# Patient Record
Sex: FEMALE | Race: WHITE | NOT HISPANIC OR LATINO | Employment: UNEMPLOYED | URBAN - NONMETROPOLITAN AREA
[De-identification: names, ages, dates, MRNs, and addresses within clinical notes are randomized per-mention and may not be internally consistent; named-entity substitution may affect disease eponyms.]

---

## 2023-03-24 PROBLEM — R87.612 LGSIL ON PAP SMEAR OF CERVIX: Status: ACTIVE | Noted: 2023-03-24

## 2023-03-24 PROBLEM — R09.81 NASAL CONGESTION: Status: ACTIVE | Noted: 2023-03-24

## 2023-03-24 PROBLEM — J32.4 CHRONIC PANSINUSITIS: Status: ACTIVE | Noted: 2023-03-24

## 2023-03-24 PROBLEM — J34.89 NASAL CRUSTING: Status: ACTIVE | Noted: 2023-03-24

## 2023-03-24 PROBLEM — N39.0 UTI (URINARY TRACT INFECTION): Status: ACTIVE | Noted: 2023-03-24

## 2023-03-24 PROBLEM — R06.83 SNORING: Status: ACTIVE | Noted: 2023-03-24

## 2023-03-24 PROBLEM — R63.5 WEIGHT GAIN: Status: ACTIVE | Noted: 2023-03-24

## 2023-03-24 PROBLEM — R89.6 ABNORMAL CYTOLOGY: Status: ACTIVE | Noted: 2023-03-24

## 2023-03-24 RX ORDER — MULTIVITAMIN
TABLET ORAL
COMMUNITY
Start: 2019-01-08 | End: 2023-03-28 | Stop reason: ALTCHOICE

## 2023-03-24 RX ORDER — LEVONORGESTREL 52 MG/1
INTRAUTERINE DEVICE INTRAUTERINE
COMMUNITY
Start: 2019-05-29 | End: 2023-03-28 | Stop reason: ALTCHOICE

## 2023-03-28 ENCOUNTER — OFFICE VISIT (OUTPATIENT)
Dept: PRIMARY CARE | Facility: CLINIC | Age: 28
End: 2023-03-28
Payer: COMMERCIAL

## 2023-03-28 VITALS
SYSTOLIC BLOOD PRESSURE: 114 MMHG | BODY MASS INDEX: 39.18 KG/M2 | WEIGHT: 235.2 LBS | HEART RATE: 84 BPM | HEIGHT: 65 IN | DIASTOLIC BLOOD PRESSURE: 70 MMHG

## 2023-03-28 DIAGNOSIS — N91.2 AMENORRHEA: ICD-10-CM

## 2023-03-28 DIAGNOSIS — E66.9 OBESITY (BMI 35.0-39.9 WITHOUT COMORBIDITY): ICD-10-CM

## 2023-03-28 DIAGNOSIS — R91.1 LUNG NODULE SEEN ON IMAGING STUDY: ICD-10-CM

## 2023-03-28 DIAGNOSIS — R11.0 NAUSEA: Primary | ICD-10-CM

## 2023-03-28 PROCEDURE — 99204 OFFICE O/P NEW MOD 45 MIN: CPT | Performed by: NURSE PRACTITIONER

## 2023-03-28 ASSESSMENT — ENCOUNTER SYMPTOMS
BRUISES/BLEEDS EASILY: 0
RESPIRATORY NEGATIVE: 1
WHEEZING: 0
PALPITATIONS: 0
CONSTIPATION: 0
APPETITE CHANGE: 0
VOMITING: 0
DIARRHEA: 0
DIFFICULTY URINATING: 0
DYSURIA: 0
FEVER: 0
ACTIVITY CHANGE: 0
ABDOMINAL PAIN: 0
CHEST TIGHTNESS: 0
LIGHT-HEADEDNESS: 0
SHORTNESS OF BREATH: 0
COLOR CHANGE: 0
NAUSEA: 1
MYALGIAS: 0
BLOOD IN STOOL: 0
DIZZINESS: 0
HEADACHES: 0
ARTHRALGIAS: 0

## 2023-03-28 NOTE — PROGRESS NOTES
"Subjective   Patient ID: Mariano Arreguin is a 27 y.o. female who presents for ER Follow-up (NP - Follow-up from her ER visit on on 3/23).    Here to establish care after ER visit for abd cramping on 3/23/23, ER record reviewed.  CT A/P showed lung nodule 6 mm, benign, low suspicion  Lab testing including UA unremarkable.     [facility]: 02/20/2023/home pregnancy negative, ER pregnancy was negative, still has not had period  Mirana removed in 01/2022  Trying to conceive  Has appointment with GYN on Thursday  Denies abd pain/cramping  Has had episodes of nausea, no vomiting, no diarrhea  Appetite stable  H/O chronic allergies, on allergy injections Dr Ochao, 2 nasal surgeries  Tonsillectomy/adenoidectomy  Follows with GYN, H/O abnormal PAP           Review of Systems   Constitutional:  Negative for activity change, appetite change and fever.   HENT: Negative.     Respiratory: Negative.  Negative for chest tightness, shortness of breath and wheezing.    Cardiovascular:  Negative for chest pain, palpitations and leg swelling.   Gastrointestinal:  Positive for nausea. Negative for abdominal pain, blood in stool, constipation, diarrhea and vomiting.   Genitourinary:  Negative for difficulty urinating, dysuria, pelvic pain and vaginal bleeding.   Musculoskeletal:  Negative for arthralgias and myalgias.   Skin:  Negative for color change.   Neurological:  Negative for dizziness, light-headedness and headaches.   Hematological:  Does not bruise/bleed easily.       Objective   /70 (Patient Position: Sitting)   Pulse 84   Ht 1.651 m (5' 5\")   Wt 107 kg (235 lb 3.2 oz)   BMI 39.14 kg/m²     Physical Exam  Constitutional:       General: She is not in acute distress.     Appearance: Normal appearance. She is obese. She is not ill-appearing.   HENT:      Head: Normocephalic.   Eyes:      Conjunctiva/sclera: Conjunctivae normal.      Pupils: Pupils are equal, round, and reactive to light.   Cardiovascular:      Rate and Rhythm: " Normal rate and regular rhythm.      Pulses: Normal pulses.   Pulmonary:      Effort: Pulmonary effort is normal.      Breath sounds: Normal breath sounds.   Abdominal:      General: Bowel sounds are normal.      Palpations: Abdomen is soft. There is no mass.      Tenderness: There is no abdominal tenderness.   Skin:     General: Skin is warm and dry.      Coloration: Skin is not pale.   Neurological:      Mental Status: She is alert and oriented to person, place, and time.         Assessment/Plan   Diagnoses and all orders for this visit:  Nausea   -Currently asymptomatic  Amenorrhea   -Urine pregnancy negative   -Follow up with GYN, appointment scheduled   Obesity (BMI 35.0-39.9 without comorbidity)   -Recommend low calorie , healthy diet and daily exercise  Lung nodule seen on imaging study   -Will repeat CT chest in 1 year  Other orders  -     Follow Up In Primary Care; Future

## 2023-04-10 LAB
DEHYDROEPIANDROSTERONE SULFATE (DHEA-S) (UG/DL) IN SER/: 147 UG/DL (ref 65–395)
PROGESTERONE (NG/ML) IN SER/PLAS: 6.9 NG/ML
THYROTROPIN (MIU/L) IN SER/PLAS BY DETECTION LIMIT <= 0.05 MIU/L: 1.54 MIU/L (ref 0.44–3.98)

## 2023-04-18 LAB
CHORIOGONADOTROPIN (MIU/ML) IN SER/PLAS: <2 MIU/ML
ESTIMATED AVERAGE GLUCOSE FOR HBA1C: 100 MG/DL
HEMOGLOBIN A1C/HEMOGLOBIN TOTAL IN BLOOD: 5.1 %
PROLACTIN (UG/L) IN SER/PLAS: 7.2 UG/L (ref 3–20)

## 2023-04-22 LAB — 17-HYDROXYPROGESTERONE (REFLAB): 119.82 NG/DL

## 2023-04-24 LAB
TESTOSTERONE FREE (CHAN): 3.2 PG/ML (ref 0.1–6.4)
TESTOSTERONE,TOTAL,LC-MS/MS: 18 NG/DL (ref 2–45)

## 2023-05-02 LAB — 17-HYDROXYPROGESTERONE (REFLAB): NORMAL NG/DL

## 2023-06-24 ENCOUNTER — PATIENT MESSAGE (OUTPATIENT)
Dept: PRIMARY CARE | Facility: CLINIC | Age: 28
End: 2023-06-24

## 2023-06-24 DIAGNOSIS — Z91.030 HISTORY OF BEE STING ALLERGY: Primary | ICD-10-CM

## 2023-07-05 RX ORDER — EPINEPHRINE 0.3 MG/.3ML
1 INJECTION SUBCUTANEOUS AS NEEDED
Qty: 1 EACH | Refills: 1 | Status: SHIPPED | OUTPATIENT
Start: 2023-07-05 | End: 2024-07-04

## 2023-09-21 ENCOUNTER — TELEMEDICINE (OUTPATIENT)
Dept: PRIMARY CARE | Facility: CLINIC | Age: 28
End: 2023-09-21
Payer: COMMERCIAL

## 2023-09-21 DIAGNOSIS — L24.7 CONTACT DERMATITIS AND ECZEMA DUE TO PLANT: Primary | ICD-10-CM

## 2023-09-21 PROCEDURE — 99213 OFFICE O/P EST LOW 20 MIN: CPT | Performed by: NURSE PRACTITIONER

## 2023-09-21 RX ORDER — PREDNISONE 20 MG/1
TABLET ORAL
Qty: 12 TABLET | Refills: 0 | Status: SHIPPED | OUTPATIENT
Start: 2023-09-21 | End: 2023-09-26

## 2023-09-21 RX ORDER — MUPIROCIN 20 MG/G
OINTMENT TOPICAL 3 TIMES DAILY
Qty: 22 G | Refills: 0 | Status: SHIPPED | OUTPATIENT
Start: 2023-09-21 | End: 2023-10-05 | Stop reason: ALTCHOICE

## 2023-09-28 ASSESSMENT — ENCOUNTER SYMPTOMS
FEVER: 0
CHILLS: 0
ARTHRALGIAS: 0

## 2023-09-28 NOTE — ASSESSMENT & PLAN NOTE
Hx and limited exam are consistent with contact dermatitis from plant.   We talked about typical course ( which can be protracted), OTCs and symptoms to report that might indicate cellulitis.   We discussed use of OTCs.  Follow with PCP any questions or concerns

## 2023-09-28 NOTE — PROGRESS NOTES
Subjective   Patient ID: Mariano Arreguin is a 28 y.o. female who presents for Rash.  Several days of itchy raised vesicular rash right lateral ankle, now weeping some serous fluid, few similar spots left forearm.   Preceded by outdoor activities and yardwork, suspects that is source given timing.        Review of Systems   Constitutional:  Negative for chills and fever.   Musculoskeletal:  Negative for arthralgias.   Skin:  Positive for rash.       Objective   Physical Exam  Constitutional:       Appearance: Normal appearance.   Pulmonary:      Effort: Pulmonary effort is normal.   Skin:     Comments: Rash of raised vesicular patch, somewhat linear, on erythematous base as noted.  Some clear discharge.         Assessment/Plan

## 2023-10-05 ENCOUNTER — OFFICE VISIT (OUTPATIENT)
Dept: PRIMARY CARE | Facility: CLINIC | Age: 28
End: 2023-10-05
Payer: COMMERCIAL

## 2023-10-05 VITALS
HEIGHT: 64 IN | DIASTOLIC BLOOD PRESSURE: 70 MMHG | SYSTOLIC BLOOD PRESSURE: 124 MMHG | HEART RATE: 90 BPM | WEIGHT: 250.7 LBS | BODY MASS INDEX: 42.8 KG/M2

## 2023-10-05 DIAGNOSIS — T78.40XA ACUTE ALLERGIC REACTION, INITIAL ENCOUNTER: Primary | ICD-10-CM

## 2023-10-05 PROCEDURE — 99213 OFFICE O/P EST LOW 20 MIN: CPT | Performed by: NURSE PRACTITIONER

## 2023-10-05 RX ORDER — METHYLPREDNISOLONE 4 MG/1
TABLET ORAL
Qty: 21 TABLET | Refills: 0 | Status: SHIPPED | OUTPATIENT
Start: 2023-10-05 | End: 2023-10-12

## 2023-10-05 ASSESSMENT — ENCOUNTER SYMPTOMS
MYALGIAS: 0
WHEEZING: 0
SHORTNESS OF BREATH: 0
ARTHRALGIAS: 0
BRUISES/BLEEDS EASILY: 0
LIGHT-HEADEDNESS: 0
HEADACHES: 0
ACTIVITY CHANGE: 0
CHEST TIGHTNESS: 0
FATIGUE: 0
FEVER: 0
DIZZINESS: 0
PALPITATIONS: 0

## 2023-10-05 NOTE — PROGRESS NOTES
"Subjective   Patient ID: Mariano Arreguin is a 28 y.o. female who presents for Itching (Started when she was on keflex - has red spots on her body in different places) and swelling in fingers (Worse recently).    Went to ER due to rash to ankle, she was Started an antibiotic 09/24/2023 (keflex) for cellulitis  Completed and steroid for 6 days.  1 week ago rash started all over body, rash has spread over the last week while completed Keflex, last dose Keflex was 10/04/2023    Is on allergy injection, follows with Dr Ochoa  Is currently trying to conceive wants to avoid any medication that may be harmful in pregnancy              Review of Systems   Constitutional:  Negative for activity change, fatigue and fever.   Respiratory:  Negative for chest tightness, shortness of breath and wheezing.    Cardiovascular:  Negative for chest pain, palpitations and leg swelling.   Musculoskeletal:  Negative for arthralgias and myalgias.   Skin:  Positive for rash.   Neurological:  Negative for dizziness, light-headedness and headaches.   Hematological:  Does not bruise/bleed easily.       Objective   /70 (Patient Position: Sitting)   Pulse 90   Ht 1.626 m (5' 4\")   Wt 114 kg (250 lb 11.2 oz)   BMI 43.03 kg/m²     Physical Exam  Vitals reviewed.   Constitutional:       General: She is not in acute distress.     Appearance: Normal appearance. She is obese.   Cardiovascular:      Rate and Rhythm: Normal rate and regular rhythm.   Pulmonary:      Effort: Pulmonary effort is normal.      Breath sounds: Normal breath sounds.   Skin:     Findings: Rash present. Rash is papular and purpuric.   Neurological:      Mental Status: She is alert and oriented to person, place, and time.         Assessment/Plan   Diagnoses and all orders for this visit:  Acute allergic reaction, initial encounter  -     methylPREDNISolone (Medrol Dospak) 4 mg tablets; Take as directed on package.  Advised to take Allegra or Claritin daily ad she may use " benadryl OTC as needed for itching.   Keflex has been marked as drug allergy in record

## 2023-10-23 ENCOUNTER — CONSULT (OUTPATIENT)
Dept: ENDOCRINOLOGY | Facility: CLINIC | Age: 28
End: 2023-10-23

## 2023-10-23 VITALS
HEIGHT: 65 IN | BODY MASS INDEX: 42.15 KG/M2 | WEIGHT: 253 LBS | DIASTOLIC BLOOD PRESSURE: 83 MMHG | HEART RATE: 73 BPM | SYSTOLIC BLOOD PRESSURE: 125 MMHG

## 2023-10-23 DIAGNOSIS — Z11.59 ENCOUNTER FOR SCREENING FOR OTHER VIRAL DISEASES: ICD-10-CM

## 2023-10-23 DIAGNOSIS — E66.01 CLASS 3 SEVERE OBESITY WITHOUT SERIOUS COMORBIDITY WITH BODY MASS INDEX (BMI) OF 40.0 TO 44.9 IN ADULT, UNSPECIFIED OBESITY TYPE (MULTI): ICD-10-CM

## 2023-10-23 DIAGNOSIS — Z31.41 FERTILITY TESTING: ICD-10-CM

## 2023-10-23 DIAGNOSIS — Z13.1 SCREENING FOR DIABETES MELLITUS: ICD-10-CM

## 2023-10-23 DIAGNOSIS — Z86.59 HISTORY OF DEPRESSION: ICD-10-CM

## 2023-10-23 DIAGNOSIS — Z01.812 ENCOUNTER FOR PREPROCEDURAL LABORATORY EXAMINATION: ICD-10-CM

## 2023-10-23 DIAGNOSIS — Z13.29 SCREENING FOR THYROID DISORDER: Primary | ICD-10-CM

## 2023-10-23 DIAGNOSIS — Z11.3 SCREENING FOR STDS (SEXUALLY TRANSMITTED DISEASES): ICD-10-CM

## 2023-10-23 DIAGNOSIS — L94.0 MORPHEA SCLERODERMA: ICD-10-CM

## 2023-10-23 DIAGNOSIS — N97.9 FEMALE INFERTILITY: ICD-10-CM

## 2023-10-23 DIAGNOSIS — Z13.71 SCREENING FOR GENETIC DISEASE CARRIER STATUS: ICD-10-CM

## 2023-10-23 DIAGNOSIS — Z01.83 ENCOUNTER FOR RH BLOOD TYPING: ICD-10-CM

## 2023-10-23 PROCEDURE — 99204 OFFICE O/P NEW MOD 45 MIN: CPT | Performed by: NURSE PRACTITIONER

## 2023-10-23 PROCEDURE — 99214 OFFICE O/P EST MOD 30 MIN: CPT | Performed by: NURSE PRACTITIONER

## 2023-10-23 ASSESSMENT — PAIN SCALES - GENERAL: PAINLEVEL: 0-NO PAIN

## 2023-10-23 ASSESSMENT — LIFESTYLE VARIABLES
TOBACCO_USE: NO
HISTORY_ALCOHOL_USE: YES
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
## 2023-10-27 ENCOUNTER — TELEPHONE (OUTPATIENT)
Dept: ENDOCRINOLOGY | Facility: CLINIC | Age: 28
End: 2023-10-27

## 2023-10-27 NOTE — TELEPHONE ENCOUNTER
TC to pt to follow up on new pt appt. With Amaris - Pt lives in [place] and would like to try to do SA,HSG and pelvic all in one day if possible; advised pt that this is not always possible but she can request it.    Also reviewed Myriad kits can be taken to  lab where they can get all of their labs drawn.   Reminded her about genetics authorization form.   Pt is ovulating this week. Transferred to  to schedule FUV: in 4-6 weeks.     10/27/23 at 2:19 PM - Cherie Cabrera RN

## 2023-11-13 ENCOUNTER — ANCILLARY PROCEDURE (OUTPATIENT)
Dept: ENDOCRINOLOGY | Facility: CLINIC | Age: 28
End: 2023-11-13

## 2023-11-13 ENCOUNTER — HOSPITAL ENCOUNTER (OUTPATIENT)
Dept: RADIOLOGY | Facility: HOSPITAL | Age: 28
Discharge: HOME | End: 2023-11-13
Payer: COMMERCIAL

## 2023-11-13 DIAGNOSIS — Z31.41 FERTILITY TESTING: ICD-10-CM

## 2023-11-13 DIAGNOSIS — R93.89 ABNORMAL RADIOGRAPHIC EXAMINATION: ICD-10-CM

## 2023-11-13 DIAGNOSIS — Z01.812 ENCOUNTER FOR PREPROCEDURAL LABORATORY EXAMINATION: ICD-10-CM

## 2023-11-13 DIAGNOSIS — Z31.41 FERTILITY TESTING: Primary | ICD-10-CM

## 2023-11-13 LAB — PREGNANCY TEST URINE, POC: NEGATIVE

## 2023-11-13 PROCEDURE — 74740 X-RAY FEMALE GENITAL TRACT: CPT

## 2023-11-13 PROCEDURE — 58340 CATHETER FOR HYSTEROGRAPHY: CPT

## 2023-11-13 PROCEDURE — 76830 TRANSVAGINAL US NON-OB: CPT

## 2023-11-13 PROCEDURE — 76830 TRANSVAGINAL US NON-OB: CPT | Performed by: OBSTETRICS & GYNECOLOGY

## 2023-11-13 PROCEDURE — 81025 URINE PREGNANCY TEST: CPT

## 2023-11-13 PROCEDURE — 2550000001 HC RX 255 CONTRASTS

## 2023-11-13 PROCEDURE — 74740 X-RAY FEMALE GENITAL TRACT: CPT | Performed by: OBSTETRICS & GYNECOLOGY

## 2023-11-13 PROCEDURE — 58340 CATHETER FOR HYSTEROGRAPHY: CPT | Performed by: OBSTETRICS & GYNECOLOGY

## 2023-11-13 PROCEDURE — 76856 US EXAM PELVIC COMPLETE: CPT | Performed by: OBSTETRICS & GYNECOLOGY

## 2023-11-13 RX ADMIN — IOHEXOL 4800 MG: 240 INJECTION, SOLUTION INTRATHECAL; INTRAVASCULAR; INTRAVENOUS; ORAL at 14:01

## 2023-11-13 NOTE — PROGRESS NOTES
Patient ID: Mariano Arreguin is a 28 y.o. female.    HSG    Date/Time: 11/13/2023 11:55 AM    Performed by: CHRISTAL Lan  Authorized by: CHRISTAL Lan    Consent:     Consent obtained:  Verbal and written    Consent given by:  Patient    Risks, benefits, and alternatives were discussed: yes      Risks discussed:  Bleeding, infection and pain  Universal protocol:     Procedure explained and questions answered to patient or proxy's satisfaction: yes      Relevant documents present and verified: yes      Test results available: yes      Imaging studies available: yes      Required blood products, implants, devices, and special equipment available: yes      Immediately prior to procedure, a time out was called: yes      Patient identity confirmed:  Verbally with patient, arm band and hospital-assigned identification number  Indications:     Indications:  Fertility testing  Pre-procedure details:     Skin preparation:  Povidone-iodine  Sedation:     Sedation type:  None  Anesthesia:     Anesthesia method:  None  Procedure specific details:      Assistant: DONELL Ingram CNP      Hysterosalpingogram (HSG) risks, benefits, alternatives, and personnel discussed with patient who agreed to proceed.    Procedural time out        Done in room where procedure done: Yes        Done just before starting procedure: Yes                                                 All members of procedural team involved in time-out: Yes                  Active communication used: Yes                                                         All team members agreed on procedure: Yes                                        Patient correctly identified by two identifiers: Yes                                  Correct side and site identified: Yes                                                     All needed special equipment/instruments available: Yes               Prior to the start of the procedure a time out was taken and the following  were verified: The identity of the patient using two patient identifiers.   Urine pregnancy test was performed and was negative.   Risks, benefits, and alternatives of the procedure were explained to the patient.  Informed consent was obtained.     The patient was placed in the dorsal lithotomy position and a sterile speculum was placed in the vagina. The cervix was sterilized with Betadine x 3. The anterior lip of the cervix was grasped with a single-tooth tenaculum. The acorn cannula was then placed in the cervix. The patient was positioned and images were taken with fluoroscopy as dye was inserted through the cannula. All instruments were then removed. The patient tolerated the procedure well and was discharged home the same day without complications.    PRELIMINARY NURSE PRACTITIONER ASSESSMENT - FOR A FINAL REPORT, PLEASE REFER TO THE FINALIZED DOCUMENTATION IN THE IMAGING TAB     Uterus:  marked arcuate uterine contour without filling defects  Tubes:  bilateral patency with free spill of dye, normal tubal architecture noted, and no loculations present.  Based on these findings, my recommendation is: Follow up required, chart forwarded to primary MD. Dr. Salas reviewed the images and TVUS today sub optimal for correlation. SIS recommended- will try to schedule this menstrual cycle., if unable then will call CD 1 next menstrual cycle     The patient was counseled regarding the above preliminary findings and understands these will be reviewed by the reading physician.     Dr. Salas reviewed images and plan.       11/13/23 at 4:46 PM - YAMILE Lan-IMELDA        Post-procedure details:     Procedure completion:  Tolerated well, no immediate complications

## 2023-11-15 ENCOUNTER — ANCILLARY PROCEDURE (OUTPATIENT)
Dept: ENDOCRINOLOGY | Facility: CLINIC | Age: 28
End: 2023-11-15

## 2023-11-15 DIAGNOSIS — Z01.812 ENCOUNTER FOR PREPROCEDURAL LABORATORY EXAMINATION: ICD-10-CM

## 2023-11-15 DIAGNOSIS — Z01.812 ENCOUNTER FOR PREPROCEDURAL LABORATORY EXAMINATION: Primary | ICD-10-CM

## 2023-11-15 DIAGNOSIS — R93.89 ABNORMAL RADIOGRAPHIC EXAMINATION: ICD-10-CM

## 2023-11-15 LAB — PREGNANCY TEST URINE, POC: NEGATIVE

## 2023-11-15 PROCEDURE — 81025 URINE PREGNANCY TEST: CPT | Performed by: NURSE PRACTITIONER

## 2023-11-15 PROCEDURE — 76831 ECHO EXAM UTERUS: CPT | Performed by: NURSE PRACTITIONER

## 2023-11-15 PROCEDURE — 76831 ECHO EXAM UTERUS: CPT | Performed by: OBSTETRICS & GYNECOLOGY

## 2023-11-15 NOTE — PROGRESS NOTES
Patient ID: Mariano Arreguin is a 28 y.o. female.    Sonohysterogram    Date/Time: 11/15/2023 2:29 PM    Performed by: CHRISTAL Vargas  Authorized by: CHRISTAL Vargas    Consent:     Consent obtained:  Verbal and written    Consent given by:  Patient    Procedural risks discussed:  Bleeding and possible continued pain    Patient questions answered: yes      Patient agrees, verbalizes understanding, and wants to proceed: yes      Instructions and paperwork completed: yes    Pre-procedure:     Pre-procedure timeout performed: yes      Prepped with: Betadine    Procedure:     Cervix cleaned and prepped: yes      Cervix dilated: no      Tenaculum applied to cervix: no      Uterus sounded: no      Catheter inserted: yes      Uterine cavity distended with saline: yes    Post-procedure:     Patient observed: yes      No complications: no      Estimated blood loss (mL): minimal.    Post procedure instructions given to patient: yes      Patient tolerated procedure well with no complications: yes    Comments:      Prior to the procedure, the patient was counseled regarding risks, benefits, and alternatives.    Saline Ultrasound Findings:  Uterus:  normal contour without filling defects, arcuate  Bubble Test performed: No  Additional Findings:   Follow up:  No further follow up required.    Images reviewed by Dr. Salas. Agreed with reading: Final viewpoint read:  Anteverted uterus with an  endometrial lining that measures as below.  The ovaries are normal in appearance bilaterally.    Sonohysterogram reveals an arcuate appearing uterine cavity with no evidence of intracavitary lesion or defect.       MILTON GO on 11/15/23 at 2:30 PM.

## 2023-11-20 ENCOUNTER — TELEPHONE (OUTPATIENT)
Dept: ENDOCRINOLOGY | Facility: CLINIC | Age: 28
End: 2023-11-20
Payer: COMMERCIAL

## 2023-11-20 NOTE — TELEPHONE ENCOUNTER
Returned call to , left voicemail letting him know Mariano's last name was updated in the system, also fixed name on [employer] and voided and resent Genetic Testing Authorization. Instructed to call back if there is anything else they need at this time.    11/20/23 at 2:58 PM - Eugenia Starr RN

## 2023-11-20 NOTE — TELEPHONE ENCOUNTER
Pt  calling to request that we change Mariano Arreguin to Mariano Quintana.  Last name changed with insurance, per .  Needs some genetic testing paperwork updated.  Please call [phone] when completed

## 2023-11-21 ENCOUNTER — TELEPHONE (OUTPATIENT)
Dept: ENDOCRINOLOGY | Facility: CLINIC | Age: 28
End: 2023-11-21
Payer: COMMERCIAL

## 2023-11-21 NOTE — TELEPHONE ENCOUNTER
TC to pt to follow up on appt with Amaris from October - no labs drawn yet and has a FUV in Dec .Please call back.    11/21/23 at 4:16 PM - Cherie Cabrera RN

## 2023-11-21 NOTE — TELEPHONE ENCOUNTER
----- Message from Aster Norwood LPN sent at 10/23/2023  3:43 PM EDT -----  Patient was informed to watch for the [employer] email.     Thank you

## 2023-11-22 ENCOUNTER — TELEPHONE (OUTPATIENT)
Dept: MATERNAL FETAL MEDICINE | Facility: CLINIC | Age: 28
End: 2023-11-22

## 2023-11-22 ENCOUNTER — LAB (OUTPATIENT)
Dept: LAB | Facility: LAB | Age: 28
End: 2023-11-22
Payer: COMMERCIAL

## 2023-11-22 DIAGNOSIS — E66.01 CLASS 3 SEVERE OBESITY WITHOUT SERIOUS COMORBIDITY WITH BODY MASS INDEX (BMI) OF 40.0 TO 44.9 IN ADULT, UNSPECIFIED OBESITY TYPE (MULTI): ICD-10-CM

## 2023-11-22 DIAGNOSIS — Z11.3 SCREENING FOR STDS (SEXUALLY TRANSMITTED DISEASES): ICD-10-CM

## 2023-11-22 DIAGNOSIS — Z31.41 FERTILITY TESTING: ICD-10-CM

## 2023-11-22 DIAGNOSIS — Z13.29 SCREENING FOR THYROID DISORDER: ICD-10-CM

## 2023-11-22 DIAGNOSIS — Z01.83 ENCOUNTER FOR RH BLOOD TYPING: ICD-10-CM

## 2023-11-22 DIAGNOSIS — Z11.59 ENCOUNTER FOR SCREENING FOR OTHER VIRAL DISEASES: ICD-10-CM

## 2023-11-22 DIAGNOSIS — Z13.1 SCREENING FOR DIABETES MELLITUS: ICD-10-CM

## 2023-11-22 LAB
ABO GROUP (TYPE) IN BLOOD: NORMAL
ALBUMIN SERPL BCP-MCNC: 4.5 G/DL (ref 3.4–5)
ALP SERPL-CCNC: 59 U/L (ref 33–110)
ALT SERPL W P-5'-P-CCNC: 74 U/L (ref 7–45)
ANION GAP SERPL CALC-SCNC: 12 MMOL/L (ref 10–20)
ANTIBODY SCREEN: NORMAL
AST SERPL W P-5'-P-CCNC: 41 U/L (ref 9–39)
BILIRUB SERPL-MCNC: 0.4 MG/DL (ref 0–1.2)
BUN SERPL-MCNC: 11 MG/DL (ref 6–23)
CALCIUM SERPL-MCNC: 9.9 MG/DL (ref 8.6–10.3)
CHLORIDE SERPL-SCNC: 105 MMOL/L (ref 98–107)
CHOLEST SERPL-MCNC: 192 MG/DL (ref 0–199)
CHOLESTEROL/HDL RATIO: 5.5
CO2 SERPL-SCNC: 25 MMOL/L (ref 21–32)
CREAT SERPL-MCNC: 0.68 MG/DL (ref 0.5–1.05)
ERYTHROCYTE [DISTWIDTH] IN BLOOD BY AUTOMATED COUNT: 12.7 % (ref 11.5–14.5)
EST. AVERAGE GLUCOSE BLD GHB EST-MCNC: 97 MG/DL
GFR SERPL CREATININE-BSD FRML MDRD: >90 ML/MIN/1.73M*2
GLUCOSE SERPL-MCNC: 108 MG/DL (ref 74–99)
HBA1C MFR BLD: 5 %
HBV SURFACE AG SERPL QL IA: NONREACTIVE
HCT VFR BLD AUTO: 42.3 % (ref 36–46)
HCV AB SER QL: NONREACTIVE
HDLC SERPL-MCNC: 35 MG/DL
HGB BLD-MCNC: 14.5 G/DL (ref 12–16)
HIV 1+2 AB+HIV1 P24 AG SERPL QL IA: NONREACTIVE
LDLC SERPL CALC-MCNC: ABNORMAL MG/DL
MCH RBC QN AUTO: 31.5 PG (ref 26–34)
MCHC RBC AUTO-ENTMCNC: 34.3 G/DL (ref 32–36)
MCV RBC AUTO: 92 FL (ref 80–100)
NON HDL CHOLESTEROL: 157 MG/DL (ref 0–149)
NRBC BLD-RTO: 0 /100 WBCS (ref 0–0)
PLATELET # BLD AUTO: 295 X10*3/UL (ref 150–450)
POTASSIUM SERPL-SCNC: 4.1 MMOL/L (ref 3.5–5.3)
PROT SERPL-MCNC: 7.1 G/DL (ref 6.4–8.2)
RBC # BLD AUTO: 4.61 X10*6/UL (ref 4–5.2)
RH FACTOR (ANTIGEN D): NORMAL
RUBV IGG SERPL IA-ACNC: 1.1 IA
RUBV IGG SERPL QL IA: POSITIVE
SODIUM SERPL-SCNC: 138 MMOL/L (ref 136–145)
T PALLIDUM AB SER QL: NONREACTIVE
TRIGL SERPL-MCNC: 426 MG/DL (ref 0–149)
TSH SERPL-ACNC: 1.94 MIU/L (ref 0.44–3.98)
VARICELLA ZOSTER IGG INDEX: 0.8 IA
VLDL: ABNORMAL
VZV IGG SER QL IA: NEGATIVE
WBC # BLD AUTO: 6.9 X10*3/UL (ref 4.4–11.3)

## 2023-11-22 PROCEDURE — 80061 LIPID PANEL: CPT

## 2023-11-22 PROCEDURE — 87340 HEPATITIS B SURFACE AG IA: CPT

## 2023-11-22 PROCEDURE — 80053 COMPREHEN METABOLIC PANEL: CPT

## 2023-11-22 PROCEDURE — 87800 DETECT AGNT MULT DNA DIREC: CPT

## 2023-11-22 PROCEDURE — 86901 BLOOD TYPING SEROLOGIC RH(D): CPT

## 2023-11-22 PROCEDURE — 36415 COLL VENOUS BLD VENIPUNCTURE: CPT

## 2023-11-22 PROCEDURE — 86787 VARICELLA-ZOSTER ANTIBODY: CPT

## 2023-11-22 PROCEDURE — 83516 IMMUNOASSAY NONANTIBODY: CPT

## 2023-11-22 PROCEDURE — 86900 BLOOD TYPING SEROLOGIC ABO: CPT

## 2023-11-22 PROCEDURE — 84443 ASSAY THYROID STIM HORMONE: CPT

## 2023-11-22 PROCEDURE — 86780 TREPONEMA PALLIDUM: CPT

## 2023-11-22 PROCEDURE — 83036 HEMOGLOBIN GLYCOSYLATED A1C: CPT

## 2023-11-22 PROCEDURE — 86317 IMMUNOASSAY INFECTIOUS AGENT: CPT

## 2023-11-22 PROCEDURE — 86803 HEPATITIS C AB TEST: CPT

## 2023-11-22 PROCEDURE — 86850 RBC ANTIBODY SCREEN: CPT

## 2023-11-22 PROCEDURE — 87389 HIV-1 AG W/HIV-1&-2 AB AG IA: CPT

## 2023-11-22 PROCEDURE — 85027 COMPLETE CBC AUTOMATED: CPT

## 2023-11-22 NOTE — TELEPHONE ENCOUNTER
TC to pt  to follow up on new pt appt. 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  Given contact number for MFM consult.  Pt has FUV with Amaris.    11/22/23 at 2:47 PM - Cherie Cabrera RN

## 2023-11-23 LAB
C TRACH RRNA SPEC QL NAA+PROBE: NEGATIVE
N GONORRHOEA DNA SPEC QL PROBE+SIG AMP: NEGATIVE

## 2023-11-26 LAB — MIS SERPL-MCNC: 5.02 NG/ML (ref 0.4–16.02)

## 2023-12-08 ENCOUNTER — TELEMEDICINE (OUTPATIENT)
Dept: ENDOCRINOLOGY | Facility: CLINIC | Age: 28
End: 2023-12-08
Payer: COMMERCIAL

## 2023-12-08 VITALS — BODY MASS INDEX: 39.99 KG/M2 | HEIGHT: 65 IN | WEIGHT: 240 LBS

## 2023-12-08 DIAGNOSIS — N97.8 ENCOUNTER FOR MALE FACTOR INFERTILITY IN FEMALE PATIENT: ICD-10-CM

## 2023-12-08 DIAGNOSIS — Z31.81 ENCOUNTER FOR MALE FACTOR INFERTILITY IN FEMALE PATIENT: ICD-10-CM

## 2023-12-08 DIAGNOSIS — N97.9 PRIMARY FEMALE INFERTILITY: Primary | ICD-10-CM

## 2023-12-08 PROCEDURE — 99213 OFFICE O/P EST LOW 20 MIN: CPT | Performed by: NURSE PRACTITIONER

## 2023-12-08 ASSESSMENT — PAIN SCALES - GENERAL: PAINLEVEL: 2

## 2023-12-08 NOTE — PROGRESS NOTES
Virtual Visit    Follow Up Visit HPI    Patient is a 28 y.o.  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     Testing to date:   Result Date Done   Type and Screen: O 2023   Rh: POS 2023   Antibody: NEG (Ref range: )  No results found for requested labs within last 1825 days.   Rubella: Positive (Ref range: Negative) 2023   Varicella: Negative (Ref range: Negative) 2023   TSH: 1.94 (Ref range: 0.44 - 3.98 mIU/L) 2023   AMH: 5.016 (Ref range: 0.401 - 16.015 ng/mL) 2023   PRL: 7.2 (Ref range: 3.0 - 20.0 ug/L) 2023   Testosterone: No results found for requested labs within last 365 days. No results found for requested labs within last 365 days.   DHEAS: 147 (Ref range: 65 - 395 ug/dL) 4/10/2023   FSH: No results found for requested labs within last 365 days. No results found for requested labs within last 365 days.   17 OHP: No results found for requested labs within last 365 days. No results found for requested labs within last 365 days.   Hepatitis B surface antigen: Nonreactive (Ref range: Nonreactive) 2023   Hepatitis C antibody: Nonreactive (Ref range: Nonreactive) 2023   HIV ½ Antigen Antibody screen with reflex: Nonreactive (Ref range: Nonreactive) 2023   Syphilis screening with reflex: Nonreactive (Ref range: Nonreactive) 2023   Other:     Hysterosalpingogram: [place] HYSTEROSALPINGOGRAM (2023):    Arcuate versus septated uterine cavity. Bilateral fill and spill  of dye from the fallopian tubes.    Saline Infused Sonography: US SONOHYSTEROGRAM (11/15/2023):   Fertility Testing  Impression  Anteverted uterus with an endometrial lining that measures as below. The ovaries are normal in appearance bilaterally.  Sonohysterogram reveals an arcuate appearing uterine cavity with no evidence of intracavitary lesion or defect.  Uterus  Uterus:     Visualized  Uterus position:  anteverted  Description of  uterine malformations:     none  Myometrium: normal  Endometrium:      uniform echogenicity: isoechogenic  Cervix details:   normal  Endometrial thickness, total  5.0 mm  Fibroids:   No fibroids identified  Polyps:     No polyps identified  Right Ovary  Rt ovary:   Visualized  Rt ovary morphology:    normal  Left Ovary  Lt ovary:   Visualized  Lt ovary morphology:    normal  Cul de Sac    GYN Pelvic Ultrasound: US PELVIS TRANSVAGINAL (11/13/2023):   Fertility Testing  Impression  Anteverted uterus that measures as seen below with a thin endometrial lining. The ovaries are normal in appearance bilaterally.  Uterus  Uterus:     Visualized  Uterus position:  anteverted  Myometrium: normal  Endometrium:      uniform echogenicity: isoechogenic  Cervix details:   normal  Uterus length     48.3 mm  Uterus width      36.9 mm  Uterus height     26.6 mm  Endometrial thickness, total  4.7 mm  Right Ovary  Rt ovary:   Visualized  Rt ovary morphology:    normal  Rt ovary D1 32.8 mm  Rt ovary D2 27.3 mm  Rt ovary D3 24.7 mm  Rt ovary Vol      11.6 cmï¿½  Rt ovarian follicle(s): Follicles identified  Rt ovarian follicles other findings:      Antral Follicles 10+<10mm  Left Ovary  Lt ovary:   Visualized  Lt ovary morphology:    normal  Lt ovary D1 29.3 mm  Lt ovary D2 18.9 mm  Lt ovary D3 15.9 mm  Lt ovary Vol      4.6 cmï¿½  Lt ovarian follicle(s): Follicles identified  Lt ovarian follicles other findings:      Antral Follicles 7<10mm      Partner SA: Oligospermia  Volume (Semen)  >=1.5 mL 3.9   Concentration(Semen)  >=15.00 mill/mL 5.49 Abnormal    Total Motility (Semen)  >=40 % 3 Abnormal    Prog. Motility (Semen)  >=32 % 2 Abnormal    Non Prog. Motility (Semen)  % 1   Total No of Sperm (Semen)  >=39 mill 21.40 Abnormal    Total No of Motile (Semen)  mill 0.56   Total No of Rnd Cells (Semen)  <=5 mill 4.6   Leukocyte (Semen)  mill/mL <1.0 NEGATIVE   Comment: Negative <1.0 mill/mL  Positive >=1.0 mill/mL   Morphology SEE BELOW    % Normal (Semen)  >=4 % 1.3 Abnormal      Treatment to date: none    Past Medical History:   Diagnosis Date    Atypical squamous cells of undetermined significance on cytologic smear of cervix (ASC-US) 08/02/2016    Atypical squamous cells of undetermined significance (ASCUS) on Papanicolaou smear of cervix    Dysuria 09/06/2016    Dysuria    Encounter for gynecological examination (general) (routine) without abnormal findings 08/02/2016    Well female exam with routine gynecological exam    Encounter for screening for infections with a predominantly sexual mode of transmission 08/02/2016    Screen for STD (sexually transmitted disease)    Encounter for therapeutic drug level monitoring 02/23/2016    Encounter for monitoring of methotrexate therapy    Hemorrhage, not elsewhere classified 09/24/2015    Ecchymosis    Long term (current) use of antimetabolite agent 10/09/2014    Methotrexate, long term, current use    Long term (current) use of non-steroidal anti-inflammatories (nsaid) 10/09/2014    NSAID long-term use    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    Suppression of ovarian secretion    Pain in left hip 03/16/2016    Left hip pain    Pain in unspecified joint 09/24/2015    Arthralgia    Personal history of diseases of the skin and subcutaneous tissue     History of morphea    Personal history of diseases of the skin and subcutaneous tissue 07/24/2017    History of morphea    Personal history of other diseases of the musculoskeletal system and connective tissue     Personal history of scleroderma    Personal history of other specified conditions 07/26/2017    History of headache    Urinary tract infection, site not specified 09/06/2016    Acute UTI    Vitamin D deficiency, unspecified 07/24/2017    Vitamin D insufficiency     Past Surgical History:   Procedure Laterality Date    ADENOIDECTOMY  2021    NASAL RECONSTRUCTION      NASAL SEPTOPLASTY W/ TURBINOPLASTY  2021    OTHER SURGICAL HISTORY  03/18/2014  "   Superficial Cauterization Of Turbinate Mucosa    TONSILLECTOMY       Current Outpatient Medications on File Prior to Visit   Medication Sig Dispense Refill    EPINEPHrine (Epipen) 0.3 mg/0.3 mL injection syringe Inject 0.3 mL (0.3 mg) as directed if needed for anaphylaxis. Call 911 after use. 1 each 1     No current facility-administered medications on file prior to visit.       BMI:   BMI Readings from Last 1 Encounters:   23 39.94 kg/m²     VITALS:  Ht 1.651 m (5' 5\")   Wt 109 kg (240 lb)   LMP 2023 (Exact Date)   BMI 39.94 kg/m²   LMP: Patient's last menstrual period was 2023 (exact date).    ASSESSMENT   28 y.o.  female with primary infertility x 1 year, Migraines with Aura, AutoImmune: Morphea (form of scleroderma)and the following pertinent medical issues: Migraines with Aura, AutoImmune: Morphea (form of scleroderma)  .  Partner SA: Oligospermia   Total No of Motile (Semen)  mill 0.56     COUNSELING  We discussed using Ovulation Predictor Kits to time fertility treatments.   Patient aware we recommend Clear Blue Easy Digital OPK (not advanced) however there are several choices for OPK on the market and any one that she chooses is fine to use.     We discussed that she should begin testing on cycle day 10 (Day 1 is first day of full flow menses).   We discussed that she is to use the second urine of the morning and call the office with + OPK prior to 4 pm that day if planning IUI.  If not planning IUI, patient advised to have intercourse day of + OPK.  Discussed use of luteal phase progesterone. Micronized progesterone (Prometrium) is an oral pill used vaginally to increase absorption to the uterus and decrease systemic side effects. It should be started 3 days post IUI, 4 days post + OPK, or 5 days post HCG trigger. A urine pregnancy test should be taken approximately two weeks after ovulation, if + stay on the progesterone and call the office, if negative stop the " progesterone or it may delay the onset of menses.  We discussed that Letrozole is used for ovulation induction and that recent studies have found letrozole is superior to Clomid for ovulation induction in patients with PCOS. We discussed common side effects of Letrozole including headaches, vision changes, hot flashes, mood changes, and breast tenderness.  Letrozole is NOT FDA approved for the treatment of infertility and was initially associated with (in some studies) a higher risk of congenital anomalies, although this was not found in a more recent large study of patients taking Letrozole for unexplained infertility. Patients must take a pregnancy test prior to starting Letrozole each month. We discussed that there is an increased risk of multiple gestation with Letrozole approximately 10% for twins and less than 1% for triplets.  Counseled regarding option of selective reduction for higher order multiples.    Routine Testing  Fertility Center  STDs Within 1 year   Genetic carrier Waiver/Completed   T&S Within 1 year   AMH Within 1 year   TSH Within 1 year   Rubella/Varicella Within 5 years     BMI Testing  Fertility Center  CBC Within 1 year   CMP Within 1 year   HgbA1c Within 1 year   Mag, Phos, Vit D <18 Within 1 year   MFM > 40  REQ   Wt loss consult > 40 OPT     PLAN  No orders of the defined types were placed in this encounter.      FOLLOW UP   Consults: MFM consult: indication:Scleroderma  and partner to schedule consult with Dr. Finch for severe oligospermia   Engaged MD  Take prenatal vitamins, vitamin D 2000 IUs daily  Discussed that treatment cannot proceed until checklist items are complete.   Additional testing for BMI < 18 or > 40: Yes.  Patient to schedule IVF consult with Dr. Smith Advised patient to arrange this now with the .  Chart to primary nurse for care coordination and patient check list/education.  Pt to follow up with PCP for elevated liver enzymes. If cleared  by PCP consider metformin  Schedule IVF consult with Dr. Sarah Arreguin  12/08/2023  4:19 PM

## 2023-12-12 ENCOUNTER — LAB (OUTPATIENT)
Dept: LAB | Facility: LAB | Age: 28
End: 2023-12-12
Payer: COMMERCIAL

## 2023-12-12 ENCOUNTER — OFFICE VISIT (OUTPATIENT)
Dept: PRIMARY CARE | Facility: CLINIC | Age: 28
End: 2023-12-12
Payer: COMMERCIAL

## 2023-12-12 VITALS
HEART RATE: 69 BPM | DIASTOLIC BLOOD PRESSURE: 80 MMHG | BODY MASS INDEX: 41.3 KG/M2 | HEIGHT: 65 IN | WEIGHT: 247.9 LBS | SYSTOLIC BLOOD PRESSURE: 124 MMHG

## 2023-12-12 DIAGNOSIS — R74.8 ELEVATED LIVER ENZYMES: Primary | ICD-10-CM

## 2023-12-12 DIAGNOSIS — E78.1 HYPERTRIGLYCERIDEMIA: ICD-10-CM

## 2023-12-12 DIAGNOSIS — R74.8 ELEVATED LIVER ENZYMES: ICD-10-CM

## 2023-12-12 LAB
FERRITIN SERPL-MCNC: 169 NG/ML (ref 8–150)
HAV IGM SER QL: NONREACTIVE
HBV CORE IGM SER QL: NONREACTIVE
HBV SURFACE AG SERPL QL IA: NONREACTIVE
HCV AB SER QL: NONREACTIVE

## 2023-12-12 PROCEDURE — 1036F TOBACCO NON-USER: CPT | Performed by: NURSE PRACTITIONER

## 2023-12-12 PROCEDURE — 82728 ASSAY OF FERRITIN: CPT

## 2023-12-12 PROCEDURE — 80074 ACUTE HEPATITIS PANEL: CPT

## 2023-12-12 PROCEDURE — 3008F BODY MASS INDEX DOCD: CPT | Performed by: NURSE PRACTITIONER

## 2023-12-12 PROCEDURE — 99213 OFFICE O/P EST LOW 20 MIN: CPT | Performed by: NURSE PRACTITIONER

## 2023-12-12 PROCEDURE — 36415 COLL VENOUS BLD VENIPUNCTURE: CPT

## 2023-12-12 ASSESSMENT — ENCOUNTER SYMPTOMS
BRUISES/BLEEDS EASILY: 0
MYALGIAS: 0
VOMITING: 0
FEVER: 0
SHORTNESS OF BREATH: 0
HEADACHES: 0
NAUSEA: 0
LIGHT-HEADEDNESS: 0
ARTHRALGIAS: 0
ACTIVITY CHANGE: 0
PALPITATIONS: 0
CONSTIPATION: 0
DIZZINESS: 0
CHEST TIGHTNESS: 0
DIARRHEA: 0
ABDOMINAL PAIN: 0
FATIGUE: 0

## 2023-12-12 NOTE — PROGRESS NOTES
"Subjective   Patient ID: Mariano Quintana is a 28 y.o. female who presents for discuss labs.    Is seeing reproductive health team, labs drawn showed elevated liver enzymes and elevated triglycerides.  Needs follow up lab and US             Review of Systems   Constitutional:  Negative for activity change, fatigue and fever.   Respiratory:  Negative for chest tightness and shortness of breath.    Cardiovascular:  Negative for chest pain, palpitations and leg swelling.   Gastrointestinal:  Negative for abdominal pain, constipation, diarrhea, nausea and vomiting.   Musculoskeletal:  Negative for arthralgias and myalgias.   Neurological:  Negative for dizziness, light-headedness and headaches.   Hematological:  Does not bruise/bleed easily.       Objective   /80 (Patient Position: Sitting)   Pulse 69   Ht 1.651 m (5' 5\")   Wt 112 kg (247 lb 14.4 oz)   LMP 12/07/2023 (Exact Date)   BMI 41.25 kg/m²     Physical Exam  Vitals reviewed.   Constitutional:       General: She is not in acute distress.     Appearance: Normal appearance. She is obese.   Cardiovascular:      Rate and Rhythm: Normal rate and regular rhythm.   Pulmonary:      Effort: Pulmonary effort is normal.   Abdominal:      General: Bowel sounds are normal.      Palpations: Abdomen is soft. There is no hepatomegaly or splenomegaly.   Neurological:      Mental Status: She is alert and oriented to person, place, and time.         Assessment/Plan   Diagnoses and all orders for this visit:  Elevated liver enzymes  -     US abdomen limited liver; Future  -     Ferritin; Future  -     Hepatitis panel, acute; Future  Hypertriglyceridemia   Advised to follow heart healthy diet, will hold off on medication at this time, recommend OTC omega -3 or Co Q-10 supplements if cleared by reproductive health team      "

## 2023-12-13 ENCOUNTER — HOSPITAL ENCOUNTER (OUTPATIENT)
Dept: RADIOLOGY | Facility: HOSPITAL | Age: 28
Discharge: HOME | End: 2023-12-13
Payer: COMMERCIAL

## 2023-12-13 DIAGNOSIS — R74.8 ELEVATED LIVER ENZYMES: ICD-10-CM

## 2023-12-13 PROCEDURE — 76705 ECHO EXAM OF ABDOMEN: CPT | Performed by: RADIOLOGY

## 2023-12-13 PROCEDURE — 76705 ECHO EXAM OF ABDOMEN: CPT

## 2023-12-14 ENCOUNTER — TELEPHONE (OUTPATIENT)
Dept: PRIMARY CARE | Facility: CLINIC | Age: 28
End: 2023-12-14
Payer: COMMERCIAL

## 2023-12-14 DIAGNOSIS — R79.89 ELEVATED FERRITIN LEVEL: Primary | ICD-10-CM

## 2023-12-14 NOTE — TELEPHONE ENCOUNTER
"Result Communication  Reviewed labs and recent Liver UA with patient, US did confirm \"fatty liver\" or steatosis, ferritin was elevated will check iron levels.  Patient may proceed with PCOS treatment including metformin.       Resulted Orders   Ferritin   Result Value Ref Range    Ferritin 169 (H) 8 - 150 ng/mL   Hepatitis panel, acute   Result Value Ref Range    Hepatitis B Surface AG Nonreactive Nonreactive      Comment:      Biotin interference may cause falsely decreased results. Patients taking a Biotin dose of up to 5 mg/day should refrain from taking Biotin for 24 hours before sample collection. Providers may contact their local laboratory for  further information.    Hepatitis A  AB- IgM Nonreactive Nonreactive      Comment:      Biotin interference may cause falsely decreased results. Patients taking a Biotin dose of up to 5 mg/day should refrain from taking Biotin for 24 hours before sample collection. Providers may contact their local laboratory  for further information.        Hepatitis B Core AB; IgM Nonreactive Nonreactive      Comment:      Results from patients taking biotin supplements or receiving high-dose biotin therapy should be interpreted with caution due to possible interference with this test. Providers may contact their local laboratory for further information.        Hepatitis C AB Nonreactive Nonreactive      Comment:      Results from patients taking biotin supplements or receiving high-dose biotin therapy should be interpreted with caution due to possible interference with this test. Providers may contact their local laboratory for further information.       1:27 PM      Results were successfully communicated with the patient and they acknowledged their understanding.    "

## 2023-12-18 ENCOUNTER — LAB (OUTPATIENT)
Dept: LAB | Facility: LAB | Age: 28
End: 2023-12-18
Payer: COMMERCIAL

## 2023-12-18 DIAGNOSIS — R79.89 ELEVATED FERRITIN LEVEL: ICD-10-CM

## 2023-12-18 LAB
IRON SATN MFR SERPL: 32 % (ref 25–45)
IRON SERPL-MCNC: 92 UG/DL (ref 35–150)
TIBC SERPL-MCNC: 286 UG/DL (ref 240–445)
UIBC SERPL-MCNC: 194 UG/DL (ref 110–370)

## 2023-12-18 PROCEDURE — 36415 COLL VENOUS BLD VENIPUNCTURE: CPT

## 2023-12-18 PROCEDURE — 83550 IRON BINDING TEST: CPT

## 2023-12-18 PROCEDURE — 83540 ASSAY OF IRON: CPT

## 2023-12-20 ENCOUNTER — TELEPHONE (OUTPATIENT)
Dept: MATERNAL FETAL MEDICINE | Facility: CLINIC | Age: 28
End: 2023-12-20
Payer: COMMERCIAL

## 2023-12-20 NOTE — TELEPHONE ENCOUNTER
Returned pt's call. LM to call the office back.     12/20/23 at 11:47 AM - Cherie Cabrera RN     Pt called back; states she was cleared to take Metform per her PCP, IMELDA Calles; note is in the chart on 12/14. RN sent note to Amaris to let her know; reviewed step up dosing and side effects of Metform.  Reviewed Myraid results - no mutual mutations; pt is planning on getting varicella vaccine and will let us know when; aware to wait to TTC for 30 days; Partner has appt. With Dr. RODRIGEZ in January; will await outcome for plan IUI vs IVF.    12/20/23 at 12:14 PM - Cherie Cabrera RN

## 2023-12-20 NOTE — TELEPHONE ENCOUNTER
MARGARET DENISE     PT is calling in regards to starting her metformin. She has been cleared by her PCP.

## 2023-12-27 ENCOUNTER — INITIAL PRENATAL (OUTPATIENT)
Dept: MATERNAL FETAL MEDICINE | Facility: CLINIC | Age: 28
End: 2023-12-27
Payer: COMMERCIAL

## 2023-12-27 ENCOUNTER — TELEPHONE (OUTPATIENT)
Dept: MATERNAL FETAL MEDICINE | Facility: CLINIC | Age: 28
End: 2023-12-27

## 2023-12-27 VITALS
WEIGHT: 249 LBS | SYSTOLIC BLOOD PRESSURE: 140 MMHG | BODY MASS INDEX: 41.48 KG/M2 | DIASTOLIC BLOOD PRESSURE: 92 MMHG | HEIGHT: 65 IN | HEART RATE: 94 BPM

## 2023-12-27 DIAGNOSIS — E28.2 PCOS (POLYCYSTIC OVARIAN SYNDROME): Primary | ICD-10-CM

## 2023-12-27 DIAGNOSIS — M34.9: ICD-10-CM

## 2023-12-27 PROCEDURE — 99215 OFFICE O/P EST HI 40 MIN: CPT | Performed by: STUDENT IN AN ORGANIZED HEALTH CARE EDUCATION/TRAINING PROGRAM

## 2023-12-27 RX ORDER — METFORMIN HYDROCHLORIDE 500 MG/1
TABLET, EXTENDED RELEASE ORAL
Qty: 90 TABLET | Refills: 2 | Status: SHIPPED | OUTPATIENT
Start: 2023-12-27

## 2023-12-27 NOTE — PROGRESS NOTES
"Mariano Quintana is a 29 yo G0 who presents for a preconception consultation for a history of scleroderma. Mariano reports she was diagnosed at age 15 with skin findings. She took Methotrexate \"on and off for about 6 years.\" She denies other systemic disease involvement. Mariano states she stopped seeing Rheumatology in 2016. Today she is doing ok but does reports occasional joint pain in her hands. Of note, she recently had a CT abdomen and pelvis that showed hepatic steatosis and multiple pulmonary nodules measuring up to 6 mm in size. Per the report, the nodules are nonspecific and of \"doubtful clinical significance given patient's stated age.\"     PMHX: Migraines  PSHX: septoplasty, turbinoplasty, tonsillectomy, adenoidectomy, graft for nose  OBHX: G0  GYN: denies STIs  MED: PNV, vitamin D, omega-3  ALL: Keflex, Compazine  SOCIAL: denies tobacco/alcohol/illicit drug use  FAMILY: father - DMII, HTN    O: BP (!) 140/92 (BP Location: Right arm, Patient Position: Sitting)   Pulse 94   Ht 1.651 m (5' 5\")   Wt 113 kg (249 lb)   LMP 12/07/2023 (Exact Date)   BMI 41.44 kg/m²   Gen: NAD  Resp: nonlabored breathing  Cardiac: good peripheral perfusion  Psych: appropriate mood and affect    A/P: Mariano Quintana is a 29 yo G0 who presents for a preconception consultation for a history of scleroderma    *Scleroderma (also known as Systemic Sclerosis)  Systemic Sclerosis is a disease that can affect a variety of organ systems and lead to both vascular dysfunction and fibrosis of the skin. It is diagnosed based on clinical findings but can also have abnormal serologic testing (I.e. abnormal LAMONTE, anti-Scl-70, ANDRÉS, anti-RNA polymerase III antibody). There are multiple subsets including limited cutaneous systemic sclerosis, diffuse cutaneous systemic sclerosis, systemic sclerosis without skin involvement and systemic sclerosis with overlap of other autoimmune diseases. Mariano's symptoms appear to be most consistent with limited cutaneous " systemic sclerosis.    Women with systemic sclerosis can have successful pregnancies, however, there is some data that suggests an increased risk of miscarriage,  delivery and fetal growth restriction.     We discussed that pregnant patients with systemic sclerosis should be monitored closely for disease activity and sequelae. It is essential they follow with their Rheumatologist. They should have a recent echocardiogram to screen for pulmonary hypertension. I have taken the liberty of ordering this today. Baseline pre-eclampsia labs are recommended due to the increased risk of PreE, but also due to the risk of systemic sclerosis renal crisis. A low dose aspirin daily starting at 12 weeks is recommended. Given the possibility of multi-organ involvement, a third trimester anesthesia consultation is recommended.     Systemic sclerosis may overlap with lupus, as such I recommend SSA/SSB antibody testing. As systemic sclerosis is a vascular disease, close monitoring of fetal growth is recommended, with serial growth ultrasounds starting at 28 weeks gestation. Antepartum testing with weekly nonstress tests is recommended starting at 32 weeks. Delivery at [id number]-59h2h is recommended. If there is perineal skin fibrosis, vaginal delivery may be difficult to achieve, but the risks and benefits of  should be discussed.    Thank you for the consultation. I have recommended that Mariano establish care with a Rheumatologist. At this time there is no contraindication to pregnancy. I Would be happy to see Mariano back for a consultation once she becomes pregnant.     Min Garcia MD  Maternal-Fetal Medicine

## 2023-12-27 NOTE — PROGRESS NOTES
Pt. Given referral req for rheumatology and tte along with number to schedule. Pt verbalizes understanding and agrees to plan.  Pt. Has no further questions or needs at this time.  Pt. Has Dannielle number to call if further questions arise.

## 2024-01-02 ENCOUNTER — OFFICE VISIT (OUTPATIENT)
Dept: RHEUMATOLOGY | Facility: CLINIC | Age: 29
End: 2024-01-02
Payer: COMMERCIAL

## 2024-01-02 VITALS
BODY MASS INDEX: 41.83 KG/M2 | HEART RATE: 77 BPM | SYSTOLIC BLOOD PRESSURE: 119 MMHG | HEIGHT: 64 IN | WEIGHT: 245 LBS | TEMPERATURE: 96.8 F | DIASTOLIC BLOOD PRESSURE: 77 MMHG

## 2024-01-02 DIAGNOSIS — M34.9: ICD-10-CM

## 2024-01-02 DIAGNOSIS — L94.0 MORPHEA: Primary | ICD-10-CM

## 2024-01-02 DIAGNOSIS — M25.50 ARTHRALGIA, UNSPECIFIED JOINT: ICD-10-CM

## 2024-01-02 PROCEDURE — 3008F BODY MASS INDEX DOCD: CPT | Performed by: INTERNAL MEDICINE

## 2024-01-02 PROCEDURE — 99204 OFFICE O/P NEW MOD 45 MIN: CPT | Performed by: INTERNAL MEDICINE

## 2024-01-02 PROCEDURE — 1036F TOBACCO NON-USER: CPT | Performed by: INTERNAL MEDICINE

## 2024-01-02 ASSESSMENT — PAIN SCALES - GENERAL: PAINLEVEL: 0-NO PAIN

## 2024-01-02 NOTE — PROGRESS NOTES
Subjective   Patient ID: Mariano Quintana is a 28 y.o. female who presents for New Patient Visit (New patient visit).    HPI  27 yo female with morphea  The patient used MTX for morphea for 6 years, then she stopped it in 2016.  She is trying to get pregnant now and GYN referred her to us.  Her morphea, was diagnosed at age 15 and localized in her left thigh and left site of abdomen.   MTX helped in her morphea lesions, but after she stopped it, her lesions remained stable.  She reports swollen fingers and Raynaud like symptoms.   She has GERD as well.   She also reports dizziness and palpitation episodes.  Her AM stiffness lasts 30 min  No h/o miscarriage or DVT  She denies dry eyes, dry mouth  Family h/o did not reveal any inflammatory arthritis.    ROS  Joint pain in hands: positive  Joint swelling: negative  Morning stiffness and duration: positive 10 min   strength: normal  Oral ulcer: negative  Genital ulcer: negative  Raynaud phenomenon: negative  Chest pain/dyspnea: negative  Low back pain: negative  Visual problem: negative  Dry eyes/dry mouth: negative  Skin rash/scaling/psoriasis: negative       Objective     PEXAM  VS reviewed, WNL  General: Alert, no distress   HEENT: Normocephalic/atraumatic, No alopecia. PERRLA. Sclera white, conjunctiva pink, no malar rash. no oral or nasal ulcer. Oral cavity pink and moist, no erythema or exudate, dentition good.   Neck: supple  Respiratory: CTA B, no adventitious breath sounds  Cardiac: RRR, no murmurs, carotid, or bruits  Abdominal: symmetrical, soft, non-tender, non-distended, normoactive BSx4 quadrants, no CVA tenderness or suprapubic tenderness  MSK: Joints of upper and lower extremities were assessed for synovitis and ROM.    Today she has no evidence of synovitis in the joints of her hands or wrists, tender joint count 0, swollen joint count 0   Extremities: no clubbing, no cyanosis, no edema  Skin: Skin warm and moist.   +morphea in her left abdomen and  thigh  Neuro: non-focal, Strength 5/5 throughout. Normal gait. No cerebellar pathologic exam     Assessment/Plan   27 yo female with morphea  It was diagnosed at age 15 and used MTX for 6 years, stopped it in 2016.  MTX helped moderately.  She is trying to get pregnant now.  Also, she has noted swelling in her fingers and Raynaud like mild symptoms.  PExam did not reveal any arthritis, sclerodactyli, telangiectasis, it showed only morphea lesions in her left abdomen and thigh.  I think she has only localized scleroderma, morhea.    -will see her ESR, CRP, LAMONTE, RF  -will see her once a year

## 2024-01-03 ENCOUNTER — LAB (OUTPATIENT)
Dept: LAB | Facility: LAB | Age: 29
End: 2024-01-03
Payer: COMMERCIAL

## 2024-01-03 DIAGNOSIS — L94.0 MORPHEA: ICD-10-CM

## 2024-01-03 DIAGNOSIS — M25.50 ARTHRALGIA, UNSPECIFIED JOINT: ICD-10-CM

## 2024-01-03 LAB
ALBUMIN SERPL BCP-MCNC: 4.5 G/DL (ref 3.4–5)
ALP SERPL-CCNC: 57 U/L (ref 33–110)
ALT SERPL W P-5'-P-CCNC: 48 U/L (ref 7–45)
ANION GAP SERPL CALC-SCNC: 12 MMOL/L (ref 10–20)
AST SERPL W P-5'-P-CCNC: 26 U/L (ref 9–39)
BASOPHILS # BLD AUTO: 0.03 X10*3/UL (ref 0–0.1)
BASOPHILS NFR BLD AUTO: 0.4 %
BILIRUB SERPL-MCNC: 0.3 MG/DL (ref 0–1.2)
BUN SERPL-MCNC: 11 MG/DL (ref 6–23)
CALCIUM SERPL-MCNC: 9.7 MG/DL (ref 8.6–10.3)
CCP IGG SERPL-ACNC: <1 U/ML
CHLORIDE SERPL-SCNC: 105 MMOL/L (ref 98–107)
CO2 SERPL-SCNC: 25 MMOL/L (ref 21–32)
CREAT SERPL-MCNC: 0.68 MG/DL (ref 0.5–1.05)
CRP SERPL-MCNC: 0.75 MG/DL
EOSINOPHIL # BLD AUTO: 0.13 X10*3/UL (ref 0–0.7)
EOSINOPHIL NFR BLD AUTO: 1.8 %
ERYTHROCYTE [DISTWIDTH] IN BLOOD BY AUTOMATED COUNT: 12.2 % (ref 11.5–14.5)
ERYTHROCYTE [SEDIMENTATION RATE] IN BLOOD BY WESTERGREN METHOD: 11 MM/H (ref 0–20)
GFR SERPL CREATININE-BSD FRML MDRD: >90 ML/MIN/1.73M*2
GLUCOSE SERPL-MCNC: 109 MG/DL (ref 74–99)
HCT VFR BLD AUTO: 44 % (ref 36–46)
HGB BLD-MCNC: 15.2 G/DL (ref 12–16)
IMM GRANULOCYTES # BLD AUTO: 0.03 X10*3/UL (ref 0–0.7)
IMM GRANULOCYTES NFR BLD AUTO: 0.4 % (ref 0–0.9)
LYMPHOCYTES # BLD AUTO: 2.61 X10*3/UL (ref 1.2–4.8)
LYMPHOCYTES NFR BLD AUTO: 36.4 %
MCH RBC QN AUTO: 31.7 PG (ref 26–34)
MCHC RBC AUTO-ENTMCNC: 34.5 G/DL (ref 32–36)
MCV RBC AUTO: 92 FL (ref 80–100)
MONOCYTES # BLD AUTO: 0.45 X10*3/UL (ref 0.1–1)
MONOCYTES NFR BLD AUTO: 6.3 %
NEUTROPHILS # BLD AUTO: 3.93 X10*3/UL (ref 1.2–7.7)
NEUTROPHILS NFR BLD AUTO: 54.7 %
NRBC BLD-RTO: 0 /100 WBCS (ref 0–0)
PLATELET # BLD AUTO: 296 X10*3/UL (ref 150–450)
POTASSIUM SERPL-SCNC: 4 MMOL/L (ref 3.5–5.3)
PROT SERPL-MCNC: 7.1 G/DL (ref 6.4–8.2)
RBC # BLD AUTO: 4.79 X10*6/UL (ref 4–5.2)
RHEUMATOID FACT SER NEPH-ACNC: <10 IU/ML (ref 0–15)
SODIUM SERPL-SCNC: 138 MMOL/L (ref 136–145)
WBC # BLD AUTO: 7.2 X10*3/UL (ref 4.4–11.3)

## 2024-01-03 PROCEDURE — 86235 NUCLEAR ANTIGEN ANTIBODY: CPT

## 2024-01-03 PROCEDURE — 85025 COMPLETE CBC W/AUTO DIFF WBC: CPT

## 2024-01-03 PROCEDURE — 86431 RHEUMATOID FACTOR QUANT: CPT

## 2024-01-03 PROCEDURE — 86225 DNA ANTIBODY NATIVE: CPT

## 2024-01-03 PROCEDURE — 36415 COLL VENOUS BLD VENIPUNCTURE: CPT

## 2024-01-03 PROCEDURE — 86038 ANTINUCLEAR ANTIBODIES: CPT

## 2024-01-03 PROCEDURE — 86200 CCP ANTIBODY: CPT

## 2024-01-03 PROCEDURE — 85652 RBC SED RATE AUTOMATED: CPT

## 2024-01-03 PROCEDURE — 80053 COMPREHEN METABOLIC PANEL: CPT

## 2024-01-03 PROCEDURE — 86140 C-REACTIVE PROTEIN: CPT

## 2024-01-04 LAB
ANA PATTERN: ABNORMAL
ANA SER QL HEP2 SUBST: POSITIVE
ANA TITR SER IF: ABNORMAL {TITER}
CENTROMERE B AB SER-ACNC: <0.2 AI
CHROMATIN AB SERPL-ACNC: <0.2 AI
DSDNA AB SER-ACNC: 2 IU/ML
ENA JO1 AB SER QL IA: <0.2 AI
ENA RNP AB SER IA-ACNC: 0.3 AI
ENA SCL70 AB SER QL IA: <0.2 AI
ENA SM AB SER IA-ACNC: <0.2 AI
ENA SM+RNP AB SER QL IA: <0.2 AI
ENA SS-A AB SER IA-ACNC: <0.2 AI
ENA SS-B AB SER IA-ACNC: <0.2 AI
RIBOSOMAL P AB SER-ACNC: <0.2 AI

## 2024-01-09 ENCOUNTER — APPOINTMENT (OUTPATIENT)
Dept: CARDIOLOGY | Facility: HOSPITAL | Age: 29
End: 2024-01-09
Payer: COMMERCIAL

## 2024-04-01 ENCOUNTER — APPOINTMENT (OUTPATIENT)
Dept: PRIMARY CARE | Facility: CLINIC | Age: 29
End: 2024-04-01
Payer: COMMERCIAL

## 2024-04-04 ENCOUNTER — APPOINTMENT (OUTPATIENT)
Dept: ENDOCRINOLOGY | Facility: CLINIC | Age: 29
End: 2024-04-04
Payer: COMMERCIAL

## 2024-06-24 ENCOUNTER — APPOINTMENT (OUTPATIENT)
Dept: ENDOCRINOLOGY | Facility: CLINIC | Age: 29
End: 2024-06-24
Payer: COMMERCIAL

## 2024-06-28 ENCOUNTER — APPOINTMENT (OUTPATIENT)
Dept: ENDOCRINOLOGY | Facility: CLINIC | Age: 29
End: 2024-06-28
Payer: COMMERCIAL

## 2024-07-08 ENCOUNTER — PATIENT MESSAGE (OUTPATIENT)
Dept: RHEUMATOLOGY | Facility: CLINIC | Age: 29
End: 2024-07-08
Payer: COMMERCIAL

## 2024-07-08 DIAGNOSIS — L94.0 MORPHEA: ICD-10-CM

## 2024-07-08 DIAGNOSIS — M25.50 ARTHRALGIA, UNSPECIFIED JOINT: Primary | ICD-10-CM

## 2024-07-09 ENCOUNTER — LAB (OUTPATIENT)
Dept: LAB | Facility: LAB | Age: 29
End: 2024-07-09
Payer: COMMERCIAL

## 2024-07-09 DIAGNOSIS — M25.50 ARTHRALGIA, UNSPECIFIED JOINT: ICD-10-CM

## 2024-07-09 DIAGNOSIS — L94.0 MORPHEA: ICD-10-CM

## 2024-07-09 LAB
ALBUMIN SERPL BCP-MCNC: 4.4 G/DL (ref 3.4–5)
ALP SERPL-CCNC: 62 U/L (ref 33–110)
ALT SERPL W P-5'-P-CCNC: 35 U/L (ref 7–45)
ANION GAP SERPL CALC-SCNC: 12 MMOL/L (ref 10–20)
AST SERPL W P-5'-P-CCNC: 23 U/L (ref 9–39)
BASOPHILS # BLD AUTO: 0.03 X10*3/UL (ref 0–0.1)
BASOPHILS NFR BLD AUTO: 0.3 %
BILIRUB SERPL-MCNC: 0.4 MG/DL (ref 0–1.2)
BUN SERPL-MCNC: 11 MG/DL (ref 6–23)
CALCIUM SERPL-MCNC: 9.4 MG/DL (ref 8.6–10.3)
CHLORIDE SERPL-SCNC: 105 MMOL/L (ref 98–107)
CO2 SERPL-SCNC: 26 MMOL/L (ref 21–32)
CREAT SERPL-MCNC: 0.65 MG/DL (ref 0.5–1.05)
CRP SERPL-MCNC: 1.18 MG/DL
EGFRCR SERPLBLD CKD-EPI 2021: >90 ML/MIN/1.73M*2
EOSINOPHIL # BLD AUTO: 0.14 X10*3/UL (ref 0–0.7)
EOSINOPHIL NFR BLD AUTO: 1.5 %
ERYTHROCYTE [DISTWIDTH] IN BLOOD BY AUTOMATED COUNT: 12.7 % (ref 11.5–14.5)
ERYTHROCYTE [SEDIMENTATION RATE] IN BLOOD BY WESTERGREN METHOD: 10 MM/H (ref 0–20)
GLUCOSE SERPL-MCNC: 101 MG/DL (ref 74–99)
HCT VFR BLD AUTO: 43.2 % (ref 36–46)
HGB BLD-MCNC: 14.7 G/DL (ref 12–16)
IMM GRANULOCYTES # BLD AUTO: 0.05 X10*3/UL (ref 0–0.7)
IMM GRANULOCYTES NFR BLD AUTO: 0.5 % (ref 0–0.9)
LYMPHOCYTES # BLD AUTO: 3.29 X10*3/UL (ref 1.2–4.8)
LYMPHOCYTES NFR BLD AUTO: 34.6 %
MCH RBC QN AUTO: 31.6 PG (ref 26–34)
MCHC RBC AUTO-ENTMCNC: 34 G/DL (ref 32–36)
MCV RBC AUTO: 93 FL (ref 80–100)
MONOCYTES # BLD AUTO: 0.44 X10*3/UL (ref 0.1–1)
MONOCYTES NFR BLD AUTO: 4.6 %
NEUTROPHILS # BLD AUTO: 5.55 X10*3/UL (ref 1.2–7.7)
NEUTROPHILS NFR BLD AUTO: 58.5 %
NRBC BLD-RTO: 0 /100 WBCS (ref 0–0)
PLATELET # BLD AUTO: 355 X10*3/UL (ref 150–450)
POTASSIUM SERPL-SCNC: 3.9 MMOL/L (ref 3.5–5.3)
PROT SERPL-MCNC: 6.6 G/DL (ref 6.4–8.2)
RBC # BLD AUTO: 4.65 X10*6/UL (ref 4–5.2)
SODIUM SERPL-SCNC: 139 MMOL/L (ref 136–145)
WBC # BLD AUTO: 9.5 X10*3/UL (ref 4.4–11.3)

## 2024-07-09 PROCEDURE — 86225 DNA ANTIBODY NATIVE: CPT

## 2024-07-09 PROCEDURE — 36415 COLL VENOUS BLD VENIPUNCTURE: CPT

## 2024-07-09 PROCEDURE — 80053 COMPREHEN METABOLIC PANEL: CPT

## 2024-07-09 PROCEDURE — 85025 COMPLETE CBC W/AUTO DIFF WBC: CPT

## 2024-07-09 PROCEDURE — 86235 NUCLEAR ANTIGEN ANTIBODY: CPT

## 2024-07-09 PROCEDURE — 86038 ANTINUCLEAR ANTIBODIES: CPT

## 2024-07-09 PROCEDURE — 86140 C-REACTIVE PROTEIN: CPT

## 2024-07-09 PROCEDURE — 85652 RBC SED RATE AUTOMATED: CPT

## 2024-07-11 LAB
ANA PATTERN: ABNORMAL
ANA SER QL HEP2 SUBST: POSITIVE
ANA TITR SER IF: ABNORMAL {TITER}
CENTROMERE B AB SER-ACNC: <0.2 AI
CHROMATIN AB SERPL-ACNC: <0.2 AI
DSDNA AB SER-ACNC: 2 IU/ML
ENA JO1 AB SER QL IA: <0.2 AI
ENA RNP AB SER IA-ACNC: 0.2 AI
ENA SCL70 AB SER QL IA: <0.2 AI
ENA SM AB SER IA-ACNC: <0.2 AI
ENA SM+RNP AB SER QL IA: <0.2 AI
ENA SS-A AB SER IA-ACNC: <0.2 AI
ENA SS-B AB SER IA-ACNC: <0.2 AI
RIBOSOMAL P AB SER-ACNC: <0.2 AI

## 2024-10-11 ENCOUNTER — APPOINTMENT (OUTPATIENT)
Dept: CT IMAGING | Age: 29
End: 2024-10-11
Payer: COMMERCIAL

## 2024-10-11 ENCOUNTER — HOSPITAL ENCOUNTER (EMERGENCY)
Age: 29
Discharge: HOME | End: 2024-10-11
Attending: EMERGENCY MEDICINE
Payer: COMMERCIAL

## 2024-10-11 ASSESSMENT — ENCOUNTER SYMPTOMS
NAUSEA: 1
ABDOMINAL PAIN: 1
BLOOD IN STOOL: 0
COUGH: 0
SINUS PAIN: 0
BACK PAIN: 0
SORE THROAT: 0
COLOR CHANGE: 0
DIARRHEA: 0
EYE REDNESS: 0
VOMITING: 0
SHORTNESS OF BREATH: 0
CONSTIPATION: 0
EYE DISCHARGE: 0

## 2024-10-11 ASSESSMENT — PAIN SCALES - GENERAL
PAINLEVEL_OUTOF10: 2
PAINLEVEL_OUTOF10: 4

## 2024-10-11 ASSESSMENT — PAIN DESCRIPTION - LOCATION: LOCATION: ABDOMEN

## 2024-10-11 ASSESSMENT — PAIN DESCRIPTION - PAIN TYPE: TYPE: ACUTE PAIN

## 2024-10-11 ASSESSMENT — PAIN DESCRIPTION - ONSET: ONSET: ON-GOING

## 2024-10-11 ASSESSMENT — PAIN - FUNCTIONAL ASSESSMENT: PAIN_FUNCTIONAL_ASSESSMENT: 0-10

## 2024-10-11 ASSESSMENT — PAIN DESCRIPTION - FREQUENCY: FREQUENCY: INTERMITTENT

## 2024-10-11 ASSESSMENT — LIFESTYLE VARIABLES
HOW MANY STANDARD DRINKS CONTAINING ALCOHOL DO YOU HAVE ON A TYPICAL DAY: 1 OR 2
HOW OFTEN DO YOU HAVE A DRINK CONTAINING ALCOHOL: MONTHLY OR LESS

## 2024-10-11 ASSESSMENT — PAIN DESCRIPTION - ORIENTATION: ORIENTATION: UPPER

## 2024-10-11 ASSESSMENT — PAIN DESCRIPTION - DESCRIPTORS: DESCRIPTORS: BURNING;STABBING

## 2024-10-11 NOTE — ED PROVIDER NOTES
[facility] ED  EMERGENCY DEPARTMENT ENCOUNTER      Pt Name: Jaison Blackwood  MRN: [id number]  Birthdate 1995  Date of evaluation: 10/11/2024  Provider: Ilana Warner DO  6:42 PM    CHIEF COMPLAINT       Chief Complaint   Patient presents with    Abdominal Pain     upper    Nausea     Chief complaint: Abdominal pain  History of chief complaint: This 29-year-old female presents to the emergency department complaining of intermittent abdominal pain ongoing over the last 3 days.  Patient states there is some slight constant aching discomfort points over the epigastric area but intermittently sharp intense pains predominantly after eating.  Patient states she will get nauseated feel hot and flushed with the pain.  Patient states will get increased reflux with the pain but no vomiting.  Patient states bowels have been moving no constipation or diarrhea but stools have been very yellow in appearance for the last couple months.  No dark or bloody stool or emesis.  Patient denies any radiation of pain up into the chest or through to the back.  No associated fevers or chills no sore throat cough cold congestion.  Patient denies any associated back pain no dysuria hematuria frequency or urgency.  Patient denies previous abdominal surgery.  Patient does not believe she is pregnant states menses have been regular.    Nursing Notes were reviewed.    REVIEW OF SYSTEMS       Review of Systems   Constitutional:  Negative for chills, diaphoresis and fever.   HENT:  Negative for congestion, sinus pain and sore throat.    Eyes:  Negative for discharge and redness.   Respiratory:  Negative for cough and shortness of breath.    Cardiovascular:  Negative for chest pain, palpitations and leg swelling.   Gastrointestinal:  Positive for abdominal pain and nausea. Negative for blood in stool, constipation, diarrhea and vomiting.   Genitourinary:  Negative for difficulty urinating, dysuria and flank pain.   Musculoskeletal:  Negative  tenderness, there are good bowel sounds no rebound rigidity or guarding no firm or pulsatile masses, no gross distention, femoral pulses full and symmetric  Back: Nontender to palpation no costovertebral angle tenderness  Skin: Warm and dry without rashes  Lower extremities: No edema or calf tenderness or asymmetry.    DIAGNOSTIC RESULTS       RADIOLOGY:   Non-plain film images such as CT, Ultrasound and MRI are read by the radiologist. Plain radiographic images are visualized and preliminarily interpreted by the emergency physician with the below findings:      Interpretation per the Radiologist below, if available at the time of this note:    CT ABDOMEN PELVIS WO CONTRAST Additional Contrast? None    (Results Pending)         LABS:  Labs Reviewed   CBC WITH AUTO DIFFERENTIAL   COMPREHENSIVE METABOLIC PANEL   LIPASE   URINALYSIS WITH REFLEX TO CULTURE   PREGNANCY, URINE       All other labs were within normal range or not returned as of this dictation.    EMERGENCY DEPARTMENT COURSE and DIFFERENTIAL DIAGNOSIS/MDM:   Vitals:    Vitals:    10/11/24 1830   BP: (!) 144/89   Pulse: 87   Resp: 18   Temp: 97.9 °F (36.6 °C)   TempSrc: Oral   SpO2: 98%   Weight: 108.9 kg (240 lb)   Height: 1.65 m (5' 4.96\")     Treatment and course:Patient had an IV established Zofran 4 mg IV along with Pepcid 20 mg IV was initiated here.    Signout: Patient was seen and orders were placed.  Care was signed out to oncoming [facility] ER physician Dr. Rodriguez to follow-up on results and disposition.    FINAL IMPRESSION    No diagnosis found.      DISPOSITION/PLAN   DISPOSITION    Condition at Disposition: Data Unavailable    PATIENT REFERRED TO:  No follow-up provider specified.    DISCHARGE MEDICATIONS:  New Prescriptions    No medications on file     Controlled Substances Monitoring:          No data to display                (Please note that portions of this note were completed with a voice recognition program.  Efforts were made to edit the

## 2024-10-12 NOTE — ED NOTES
Pt in no distress at time of discharge     Pt ambulated with steady gait at discharge.     Discharge instructions given to and explained to pt. Pt verbalized understanding and denies questions at this time.  Pt stable at discharge.

## 2024-10-28 SDOH — ECONOMIC STABILITY: FOOD INSECURITY: WITHIN THE PAST 12 MONTHS, YOU WORRIED THAT YOUR FOOD WOULD RUN OUT BEFORE YOU GOT MONEY TO BUY MORE.: NEVER TRUE

## 2024-10-28 SDOH — ECONOMIC STABILITY: TRANSPORTATION INSECURITY
IN THE PAST 12 MONTHS, HAS LACK OF TRANSPORTATION KEPT YOU FROM MEETINGS, WORK, OR FROM GETTING THINGS NEEDED FOR DAILY LIVING?: NO

## 2024-10-28 SDOH — ECONOMIC STABILITY: FOOD INSECURITY: WITHIN THE PAST 12 MONTHS, THE FOOD YOU BOUGHT JUST DIDN'T LAST AND YOU DIDN'T HAVE MONEY TO GET MORE.: NEVER TRUE

## 2024-10-28 SDOH — ECONOMIC STABILITY: INCOME INSECURITY: HOW HARD IS IT FOR YOU TO PAY FOR THE VERY BASICS LIKE FOOD, HOUSING, MEDICAL CARE, AND HEATING?: NOT HARD AT ALL

## 2024-10-28 ASSESSMENT — PATIENT HEALTH QUESTIONNAIRE - PHQ9
5. POOR APPETITE OR OVEREATING: MORE THAN HALF THE DAYS
SUM OF ALL RESPONSES TO PHQ QUESTIONS 1-9: 15
3. TROUBLE FALLING OR STAYING ASLEEP: SEVERAL DAYS
10. IF YOU CHECKED OFF ANY PROBLEMS, HOW DIFFICULT HAVE THESE PROBLEMS MADE IT FOR YOU TO DO YOUR WORK, TAKE CARE OF THINGS AT HOME, OR GET ALONG WITH OTHER PEOPLE: SOMEWHAT DIFFICULT
8. MOVING OR SPEAKING SO SLOWLY THAT OTHER PEOPLE COULD HAVE NOTICED. OR THE OPPOSITE - BEING SO FIDGETY OR RESTLESS THAT YOU HAVE BEEN MOVING AROUND A LOT MORE THAN USUAL: SEVERAL DAYS
9. THOUGHTS THAT YOU WOULD BE BETTER OFF DEAD, OR OF HURTING YOURSELF: NOT AT ALL
SUM OF ALL RESPONSES TO PHQ QUESTIONS 1-9: 15
7. TROUBLE CONCENTRATING ON THINGS, SUCH AS READING THE NEWSPAPER OR WATCHING TELEVISION: MORE THAN HALF THE DAYS
5. POOR APPETITE OR OVEREATING: MORE THAN HALF THE DAYS
1. LITTLE INTEREST OR PLEASURE IN DOING THINGS: MORE THAN HALF THE DAYS
3. TROUBLE FALLING OR STAYING ASLEEP: SEVERAL DAYS
6. FEELING BAD ABOUT YOURSELF - OR THAT YOU ARE A FAILURE OR HAVE LET YOURSELF OR YOUR FAMILY DOWN: NEARLY EVERY DAY
SUM OF ALL RESPONSES TO PHQ9 QUESTIONS 1 & 2: 5
SUM OF ALL RESPONSES TO PHQ9 QUESTIONS 1 & 2: 5
SUM OF ALL RESPONSES TO PHQ QUESTIONS 1-9: 15
SUM OF ALL RESPONSES TO PHQ QUESTIONS 1-9: 15
2. FEELING DOWN, DEPRESSED OR HOPELESS: NEARLY EVERY DAY
9. THOUGHTS THAT YOU WOULD BE BETTER OFF DEAD, OR OF HURTING YOURSELF: NOT AT ALL
2. FEELING DOWN, DEPRESSED OR HOPELESS: NEARLY EVERY DAY
10. IF YOU CHECKED OFF ANY PROBLEMS, HOW DIFFICULT HAVE THESE PROBLEMS MADE IT FOR YOU TO DO YOUR WORK, TAKE CARE OF THINGS AT HOME, OR GET ALONG WITH OTHER PEOPLE: SOMEWHAT DIFFICULT
8. MOVING OR SPEAKING SO SLOWLY THAT OTHER PEOPLE COULD HAVE NOTICED. OR THE OPPOSITE, BEING SO FIGETY OR RESTLESS THAT YOU HAVE BEEN MOVING AROUND A LOT MORE THAN USUAL: SEVERAL DAYS
4. FEELING TIRED OR HAVING LITTLE ENERGY: SEVERAL DAYS
4. FEELING TIRED OR HAVING LITTLE ENERGY: SEVERAL DAYS
7. TROUBLE CONCENTRATING ON THINGS, SUCH AS READING THE NEWSPAPER OR WATCHING TELEVISION: MORE THAN HALF THE DAYS
1. LITTLE INTEREST OR PLEASURE IN DOING THINGS: MORE THAN HALF THE DAYS
6. FEELING BAD ABOUT YOURSELF - OR THAT YOU ARE A FAILURE OR HAVE LET YOURSELF OR YOUR FAMILY DOWN: NEARLY EVERY DAY
SUM OF ALL RESPONSES TO PHQ QUESTIONS 1-9: 15

## 2024-10-29 ENCOUNTER — OFFICE VISIT (OUTPATIENT)
Dept: FAMILY MEDICINE CLINIC | Age: 29
End: 2024-10-29

## 2024-10-29 VITALS
WEIGHT: 240 LBS | HEART RATE: 92 BPM | BODY MASS INDEX: 39.99 KG/M2 | DIASTOLIC BLOOD PRESSURE: 72 MMHG | OXYGEN SATURATION: 99 % | TEMPERATURE: 99 F | HEIGHT: 65 IN | SYSTOLIC BLOOD PRESSURE: 108 MMHG

## 2024-10-29 DIAGNOSIS — R33.9 INCOMPLETE BLADDER EMPTYING: ICD-10-CM

## 2024-10-29 DIAGNOSIS — Z13.89 SCREENING FOR HEMATURIA OR PROTEINURIA: Primary | ICD-10-CM

## 2024-10-29 DIAGNOSIS — F33.0 MAJOR DEPRESSIVE DISORDER, RECURRENT, MILD (HCC): ICD-10-CM

## 2024-10-29 DIAGNOSIS — F41.1 GAD (GENERALIZED ANXIETY DISORDER): ICD-10-CM

## 2024-10-29 DIAGNOSIS — Z23 NEED FOR VACCINATION FOR H FLU TYPE B: Primary | ICD-10-CM

## 2024-10-29 DIAGNOSIS — N39.46 MIXED STRESS AND URGE URINARY INCONTINENCE: ICD-10-CM

## 2024-10-29 DIAGNOSIS — R35.1 NOCTURIA: ICD-10-CM

## 2024-10-29 DIAGNOSIS — R10.2 PELVIC PAIN: ICD-10-CM

## 2024-10-29 LAB
BILIRUBIN, POC: NORMAL
BLOOD URINE, POC: 25
CLARITY, POC: NORMAL
COLOR, POC: YELLOW
GLUCOSE URINE, POC: NORMAL MG/DL
KETONES, POC: NORMAL MG/DL
LEUKOCYTE EST, POC: NORMAL
NITRITE, POC: NORMAL
PH, POC: 6
PROTEIN, POC: 0.15 MG/DL
SPECIFIC GRAVITY, POC: 1.02
UROBILINOGEN, POC: 3.5 MG/DL

## 2024-10-29 NOTE — PROGRESS NOTES
After obtaining consent, and per orders of tavares marte, injection of flu given in Right deltoid by CARMENZA WHEATLEY MA. Patient instructed to remain in clinic for 20 minutes afterwards, and to report any adverse reaction to me immediately.    
which have required her to pull over.    She has made dietary changes to manage her reflux, which has improved. She does not take any over-the-counter medication for her reflux. She had an ER visit on 10/11/2024 at [facility] with upper abdominal pain and nausea but no vomiting or diarrhea. She was diagnosed with GERD and was sent home with a prescription for famotidine. She had a little reflux that day but feels like GERD is long-term and is not taking the medication.    She reports a constant feeling of bladder fullness, leading to frequent urination, including 2 to 3 times at night, which is unusual for her. She also reports mixed incontinence, with both stress and urge components. She had a UTI several months ago and feels like it has not gotten better. She does not have pain with urination but has a little bit of incontinence. She has pain in her back and pelvis. She had blood in her urine when she went to the ER. She also had a kidney stone.    She denies experiencing painful intercourse or vaginal discharge. Her menstrual cycles have lengthened over the past three months, which she does not view as problematic as she is ovulating.    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 Her JASON was positive in the past but not sensitive to anything, so rheumatology did not start her on any treatment. She sees rheumatology again in 01/2025.               Results  Laboratory Studies  Lipase was normal. CMP essentially normal. AST slightly elevated at 40. Calcium slightly elevated at 10. White blood cell count 10.4. No anemia or other CBC abnormalities.    Imaging  CT of the abdomen and pelvis showed no acute intra-abdominal pathology, no evidence of bowel obstruction or inflammation, normal appendix, mild stool burden in the colon, elevation of the left hemidiaphragm, hepatic steatosis, normal appearance of the kidneys without hydronephrosis, and

## 2024-11-08 DIAGNOSIS — R33.9 INCOMPLETE BLADDER EMPTYING: ICD-10-CM

## 2024-11-08 DIAGNOSIS — Z13.89 SCREENING FOR HEMATURIA OR PROTEINURIA: ICD-10-CM

## 2024-11-08 LAB
CREAT UR-MCNC: 226.6 MG/DL
CREAT UR-MCNC: 227.2 MG/DL
MICROALBUMIN UR-MCNC: <1.2 MG/DL
MICROALBUMIN/CREAT UR-RTO: NORMAL MG/G (ref 0–30)
PROT UR-MCNC: 15 MG/DL
PROT/CREAT UR-RTO: 0.1 ML/ML
PROT/CREAT UR-RTO: 0.1 ML/ML (ref 0–0.2)

## 2024-11-09 ENCOUNTER — HOSPITAL ENCOUNTER (OUTPATIENT)
Dept: ULTRASOUND IMAGING | Age: 29
Discharge: HOME | End: 2024-11-11
Payer: COMMERCIAL

## 2024-11-09 DIAGNOSIS — R33.9 INCOMPLETE BLADDER EMPTYING: ICD-10-CM

## 2024-11-09 PROCEDURE — 76857 US EXAM PELVIC LIMITED: CPT

## 2024-11-09 PROCEDURE — 51798 US URINE CAPACITY MEASURE: CPT

## 2024-11-14 ENCOUNTER — HOSPITAL ENCOUNTER (OUTPATIENT)
Dept: ULTRASOUND IMAGING | Age: 29
Discharge: HOME | End: 2024-11-16
Payer: COMMERCIAL

## 2024-11-14 ENCOUNTER — HOSPITAL ENCOUNTER (OUTPATIENT)
Dept: GENERAL RADIOLOGY | Age: 29
Discharge: HOME | End: 2024-11-16
Payer: COMMERCIAL

## 2024-11-14 DIAGNOSIS — R10.2 PELVIC PAIN: ICD-10-CM

## 2024-11-14 DIAGNOSIS — R35.1 NOCTURIA: ICD-10-CM

## 2024-11-14 DIAGNOSIS — R33.9 INCOMPLETE BLADDER EMPTYING: ICD-10-CM

## 2024-11-14 DIAGNOSIS — N39.46 MIXED STRESS AND URGE URINARY INCONTINENCE: ICD-10-CM

## 2024-11-14 PROCEDURE — 93975 VASCULAR STUDY: CPT

## 2024-11-14 PROCEDURE — 76856 US EXAM PELVIC COMPLETE: CPT

## 2024-11-14 PROCEDURE — 76830 TRANSVAGINAL US NON-OB: CPT

## 2024-11-14 PROCEDURE — 74018 RADEX ABDOMEN 1 VIEW: CPT

## 2024-11-19 ENCOUNTER — ANCILLARY PROCEDURE (OUTPATIENT)
Dept: CARDIOLOGY | Facility: HOSPITAL | Age: 29
End: 2024-11-19
Payer: COMMERCIAL

## 2024-11-19 DIAGNOSIS — M34.9: ICD-10-CM

## 2024-11-19 DIAGNOSIS — M34.89 OTHER SYSTEMIC SCLEROSIS: ICD-10-CM

## 2024-11-19 PROCEDURE — 2500000004 HC RX 250 GENERAL PHARMACY W/ HCPCS (ALT 636 FOR OP/ED): Performed by: STUDENT IN AN ORGANIZED HEALTH CARE EDUCATION/TRAINING PROGRAM

## 2024-11-19 PROCEDURE — 93306 TTE W/DOPPLER COMPLETE: CPT | Performed by: INTERNAL MEDICINE

## 2024-11-19 PROCEDURE — C8929 TTE W OR WO FOL WCON,DOPPLER: HCPCS

## 2024-11-20 LAB
AORTIC VALVE MEAN GRADIENT: 4 MMHG
AORTIC VALVE PEAK VELOCITY: 1.27 M/S
AV PEAK GRADIENT: 6 MMHG
AVA (PEAK VEL): 3.07 CM2
AVA (VTI): 3.4 CM2
EJECTION FRACTION APICAL 4 CHAMBER: 67.9
EJECTION FRACTION: 65 %
LEFT VENTRICLE INTERNAL DIMENSION DIASTOLE: 4.03 CM (ref 3.5–6)
LEFT VENTRICULAR OUTFLOW TRACT DIAMETER: 2 CM
LV EJECTION FRACTION BIPLANE: 61 %
MITRAL VALVE E/A RATIO: 1.9

## 2025-05-19 ENCOUNTER — TELEMEDICINE (OUTPATIENT)
Dept: FAMILY MEDICINE CLINIC | Age: 30
End: 2025-05-19
Payer: COMMERCIAL

## 2025-05-19 DIAGNOSIS — J01.90 ACUTE NON-RECURRENT SINUSITIS, UNSPECIFIED LOCATION: ICD-10-CM

## 2025-05-19 DIAGNOSIS — Z82.49 FAMILY HISTORY OF CORONARY ARTERY DISEASE: ICD-10-CM

## 2025-05-19 DIAGNOSIS — R93.1 ABNORMAL ECHOCARDIOGRAM: ICD-10-CM

## 2025-05-19 DIAGNOSIS — R06.2 WHEEZING: ICD-10-CM

## 2025-05-19 DIAGNOSIS — R06.09 DOE (DYSPNEA ON EXERTION): ICD-10-CM

## 2025-05-19 DIAGNOSIS — R05.2 SUBACUTE COUGH: Primary | ICD-10-CM

## 2025-05-19 PROCEDURE — 99214 OFFICE O/P EST MOD 30 MIN: CPT | Performed by: NURSE PRACTITIONER

## 2025-05-19 RX ORDER — EPINEPHRINE 0.15 MG/.15ML
INJECTION SUBCUTANEOUS
COMMUNITY

## 2025-05-19 RX ORDER — CLARITHROMYCIN 500 MG/1
500 TABLET ORAL 2 TIMES DAILY
Qty: 20 TABLET | Refills: 0 | Status: SHIPPED | OUTPATIENT
Start: 2025-05-19 | End: 2025-05-29

## 2025-05-19 RX ORDER — PREDNISONE 20 MG/1
20 TABLET ORAL 2 TIMES DAILY
Qty: 10 TABLET | Refills: 0 | Status: SHIPPED | OUTPATIENT
Start: 2025-05-19 | End: 2025-05-24

## 2025-05-19 SDOH — ECONOMIC STABILITY: FOOD INSECURITY: WITHIN THE PAST 12 MONTHS, YOU WORRIED THAT YOUR FOOD WOULD RUN OUT BEFORE YOU GOT MONEY TO BUY MORE.: NEVER TRUE

## 2025-05-19 SDOH — ECONOMIC STABILITY: FOOD INSECURITY: WITHIN THE PAST 12 MONTHS, THE FOOD YOU BOUGHT JUST DIDN'T LAST AND YOU DIDN'T HAVE MONEY TO GET MORE.: NEVER TRUE

## 2025-05-19 ASSESSMENT — PATIENT HEALTH QUESTIONNAIRE - PHQ9
1. LITTLE INTEREST OR PLEASURE IN DOING THINGS: NOT AT ALL
9. THOUGHTS THAT YOU WOULD BE BETTER OFF DEAD, OR OF HURTING YOURSELF: NOT AT ALL
10. IF YOU CHECKED OFF ANY PROBLEMS, HOW DIFFICULT HAVE THESE PROBLEMS MADE IT FOR YOU TO DO YOUR WORK, TAKE CARE OF THINGS AT HOME, OR GET ALONG WITH OTHER PEOPLE: NOT DIFFICULT AT ALL
2. FEELING DOWN, DEPRESSED OR HOPELESS: NOT AT ALL
SUM OF ALL RESPONSES TO PHQ QUESTIONS 1-9: 0
4. FEELING TIRED OR HAVING LITTLE ENERGY: NOT AT ALL
7. TROUBLE CONCENTRATING ON THINGS, SUCH AS READING THE NEWSPAPER OR WATCHING TELEVISION: NOT AT ALL
5. POOR APPETITE OR OVEREATING: NOT AT ALL
DEPRESSION UNABLE TO ASSESS: PT REFUSES
SUM OF ALL RESPONSES TO PHQ QUESTIONS 1-9: 0
SUM OF ALL RESPONSES TO PHQ QUESTIONS 1-9: 0
8. MOVING OR SPEAKING SO SLOWLY THAT OTHER PEOPLE COULD HAVE NOTICED. OR THE OPPOSITE, BEING SO FIGETY OR RESTLESS THAT YOU HAVE BEEN MOVING AROUND A LOT MORE THAN USUAL: NOT AT ALL
3. TROUBLE FALLING OR STAYING ASLEEP: NOT AT ALL
SUM OF ALL RESPONSES TO PHQ QUESTIONS 1-9: 0
6. FEELING BAD ABOUT YOURSELF - OR THAT YOU ARE A FAILURE OR HAVE LET YOURSELF OR YOUR FAMILY DOWN: NOT AT ALL

## 2025-05-19 NOTE — PROGRESS NOTES
have spent 30 minutes reviewing previous notes, test results and face to face with the patient discussing the diagnosis and importance of compliance with the treatment plan as well as documenting on the day of the visit.     An electronic signature was used to authenticate this note.     The patient (or guardian, if applicable) and other individuals in attendance with the patient were advised that Artificial Intelligence will be utilized during this visit to record, process the conversation to generate a clinical note, and support improvement of the AI technology. The patient (or guardian, if applicable) and other individuals in attendance at the appointment consented to the use of AI, including the recording.          Jaison Blackwood, was evaluated through a synchronous (real-time) audio-video encounter. The patient (or guardian if applicable) is aware that this is a billable service, which includes applicable co-pays. This Virtual Visit was conducted with patient's (and/or legal guardian's) consent. Patient identification was verified, and a caregiver was present when appropriate.   The patient was located at Home: [address] [place] [id number]  Provider was located at Facility (Appt Dept): [address] [place] [id number]  Confirm you are appropriately licensed, registered, or certified to deliver care in the state where the patient is located as indicated above. If you are not or unsure, please re-schedule the visit: Yes, I confirm.           ALICIA Corea - CNP

## 2025-06-09 ENCOUNTER — TELEMEDICINE (OUTPATIENT)
Dept: FAMILY MEDICINE CLINIC | Age: 30
End: 2025-06-09
Payer: COMMERCIAL

## 2025-06-09 DIAGNOSIS — F41.1 GAD (GENERALIZED ANXIETY DISORDER): Primary | ICD-10-CM

## 2025-06-09 DIAGNOSIS — R06.09 DOE (DYSPNEA ON EXERTION): ICD-10-CM

## 2025-06-09 DIAGNOSIS — F33.0 MAJOR DEPRESSIVE DISORDER, RECURRENT, MILD: ICD-10-CM

## 2025-06-09 PROCEDURE — 99214 OFFICE O/P EST MOD 30 MIN: CPT | Performed by: NURSE PRACTITIONER

## 2025-06-09 RX ORDER — HYDROXYZINE HYDROCHLORIDE 50 MG/1
50 TABLET, FILM COATED ORAL NIGHTLY
Qty: 30 TABLET | Refills: 1 | Status: SHIPPED | OUTPATIENT
Start: 2025-06-09 | End: 2025-08-08

## 2025-06-09 RX ORDER — BUSPIRONE HYDROCHLORIDE 15 MG/1
10 TABLET ORAL 3 TIMES DAILY PRN
Qty: 90 TABLET | Refills: 1 | Status: SHIPPED | OUTPATIENT
Start: 2025-06-09 | End: 2025-09-07

## 2025-06-09 NOTE — PROGRESS NOTES
Jaison Blackwood (: 1995) is a 29 y.o. female, Established patient, who presents on video today for a Telehealth visit.  Chief Complaint   Patient presents with    Follow-up     Patient states she continues to have cough and sinus issues, Has not had pft completed.  Patient would like to discuss anxiety.           Subjective     History of Present Illness  The patient is a 29-year-old established female who presents via video visit today for follow-up. She was seen via video visit on 2025, during which she reported sinus infection symptoms along with dyspnea and wheezing. Clarithromycin was prescribed for the sinus infection, and prednisone was given for the dyspnea and wheezing as these breathing symptoms had been persisting longer than the sinus symptoms. She was advised to get pulmonary function tests if her breathing symptoms did not improve. She would like to discuss her anxiety. She has been medicated for her generalized anxiety in the past but currently is not on any medication.    She reports an improvement in her sinus condition, attributing it to the antibiotic and steroid treatment. However, she continues to experience difficulty in fully catching her breath and needs to clear her throat every 10 minutes to alleviate a raspy, wheezing sensation. This throat clearing occasionally results in mucus production. She also experiences frequent reflux but has not identified specific food triggers. She does not believe her reflux symptoms have worsened concurrently with her respiratory issues.    She suspects that her anxiety may be contributing to her symptoms. She has been under significant stress following the sudden death of her father-in-law from a stroke, which occurred two weeks after the passing of her own father. She has been attending counseling sessions for several years, which she finds beneficial. However, she is currently struggling with appetite and sleep disturbances due to her

## 2025-07-28 ENCOUNTER — HOSPITAL ENCOUNTER (OUTPATIENT)
Dept: PULMONOLOGY | Age: 30
Discharge: HOME | End: 2025-07-28
Payer: COMMERCIAL

## 2025-07-28 DIAGNOSIS — R06.2 WHEEZING: ICD-10-CM

## 2025-07-28 DIAGNOSIS — R05.2 SUBACUTE COUGH: ICD-10-CM

## 2025-07-28 DIAGNOSIS — R06.09 DOE (DYSPNEA ON EXERTION): ICD-10-CM

## 2025-07-28 PROCEDURE — 6360000002 HC RX W HCPCS

## 2025-07-28 PROCEDURE — 94729 DIFFUSING CAPACITY: CPT

## 2025-07-28 PROCEDURE — 94726 PLETHYSMOGRAPHY LUNG VOLUMES: CPT

## 2025-07-28 PROCEDURE — 94060 EVALUATION OF WHEEZING: CPT

## 2025-07-28 RX ORDER — ALBUTEROL SULFATE 0.83 MG/ML
SOLUTION RESPIRATORY (INHALATION)
Status: COMPLETED
Start: 2025-07-28 | End: 2025-07-28

## 2025-07-28 RX ADMIN — ALBUTEROL SULFATE 2.5 MG: 2.5 SOLUTION RESPIRATORY (INHALATION) at 16:12

## 2025-08-04 DIAGNOSIS — J41.0 SIMPLE CHRONIC BRONCHITIS (HCC): Primary | ICD-10-CM

## 2025-08-04 PROBLEM — R05.2 SUBACUTE COUGH: Status: ACTIVE | Noted: 2025-08-04

## 2025-08-21 ENCOUNTER — OFFICE VISIT (OUTPATIENT)
Age: 30
End: 2025-08-21
Payer: COMMERCIAL

## 2025-08-21 VITALS
DIASTOLIC BLOOD PRESSURE: 70 MMHG | HEART RATE: 108 BPM | BODY MASS INDEX: 41.27 KG/M2 | OXYGEN SATURATION: 98 % | TEMPERATURE: 98 F | SYSTOLIC BLOOD PRESSURE: 124 MMHG | WEIGHT: 248 LBS

## 2025-08-21 DIAGNOSIS — R94.2 ABNORMAL PFT: ICD-10-CM

## 2025-08-21 DIAGNOSIS — E66.9 OBESITY (BMI 30-39.9): ICD-10-CM

## 2025-08-21 DIAGNOSIS — R06.02 SHORTNESS OF BREATH: Primary | ICD-10-CM

## 2025-08-21 PROCEDURE — 99204 OFFICE O/P NEW MOD 45 MIN: CPT | Performed by: INTERNAL MEDICINE

## 2025-08-21 RX ORDER — PROGESTERONE 50 MG/ML
INJECTION, SOLUTION INTRAMUSCULAR
COMMUNITY
Start: 2025-08-18

## 2025-08-28 ENCOUNTER — TELEMEDICINE (OUTPATIENT)
Dept: FAMILY MEDICINE CLINIC | Age: 30
End: 2025-08-28
Payer: COMMERCIAL

## 2025-08-28 DIAGNOSIS — M27.40 MAXILLARY CYST: ICD-10-CM

## 2025-08-28 DIAGNOSIS — F41.1 GAD (GENERALIZED ANXIETY DISORDER): ICD-10-CM

## 2025-08-28 DIAGNOSIS — Z98.890 H/O NASAL SEPTOPLASTY: Primary | ICD-10-CM

## 2025-08-28 DIAGNOSIS — J44.1 CHRONIC OBSTRUCTIVE PULMONARY DISEASE WITH ACUTE EXACERBATION (HCC): ICD-10-CM

## 2025-08-28 DIAGNOSIS — R06.09 DOE (DYSPNEA ON EXERTION): ICD-10-CM

## 2025-08-28 DIAGNOSIS — J34.3 NASAL TURBINATE HYPERTROPHY: ICD-10-CM

## 2025-08-28 DIAGNOSIS — R09.81 CHRONIC NASAL CONGESTION: ICD-10-CM

## 2025-08-28 PROCEDURE — 99214 OFFICE O/P EST MOD 30 MIN: CPT | Performed by: NURSE PRACTITIONER

## 2025-08-28 RX ORDER — AMOXICILLIN 500 MG/1
1000 CAPSULE ORAL 2 TIMES DAILY
Qty: 28 CAPSULE | Refills: 0 | Status: SHIPPED | OUTPATIENT
Start: 2025-08-28 | End: 2025-09-04

## 2025-08-28 RX ORDER — PREDNISONE 20 MG/1
20 TABLET ORAL 2 TIMES DAILY
Qty: 10 TABLET | Refills: 0 | Status: SHIPPED | OUTPATIENT
Start: 2025-08-28 | End: 2025-09-02

## 2025-09-02 ENCOUNTER — OFFICE VISIT (OUTPATIENT)
Dept: CARDIOLOGY CLINIC | Age: 30
End: 2025-09-02
Payer: COMMERCIAL

## 2025-09-02 ENCOUNTER — HOSPITAL ENCOUNTER (OUTPATIENT)
Dept: LAB | Age: 30
Discharge: HOME | End: 2025-09-02
Payer: COMMERCIAL

## 2025-09-02 VITALS
OXYGEN SATURATION: 98 % | DIASTOLIC BLOOD PRESSURE: 82 MMHG | BODY MASS INDEX: 41.8 KG/M2 | WEIGHT: 251.2 LBS | HEART RATE: 93 BPM | SYSTOLIC BLOOD PRESSURE: 122 MMHG

## 2025-09-02 DIAGNOSIS — R06.09 DOE (DYSPNEA ON EXERTION): Primary | ICD-10-CM

## 2025-09-02 DIAGNOSIS — R00.0 TACHYCARDIA: ICD-10-CM

## 2025-09-02 DIAGNOSIS — R06.09 DOE (DYSPNEA ON EXERTION): ICD-10-CM

## 2025-09-02 LAB
ERYTHROCYTE [DISTWIDTH] IN BLOOD BY AUTOMATED COUNT: 12.8 % (ref 11.5–14.5)
HCT VFR BLD AUTO: 41.1 % (ref 37–47)
HGB BLD-MCNC: 14.4 G/DL (ref 12–16)
MCH RBC QN AUTO: 32.3 PG (ref 27–31.3)
MCHC RBC AUTO-ENTMCNC: 35 % (ref 33–37)
MCV RBC AUTO: 92.2 FL (ref 79.4–94.8)
PLATELET # BLD AUTO: 336 K/UL (ref 130–400)
RBC # BLD AUTO: 4.46 M/UL (ref 4.2–5.4)
WBC # BLD AUTO: 10.7 K/UL (ref 4.8–10.8)

## 2025-09-02 PROCEDURE — 93000 ELECTROCARDIOGRAM COMPLETE: CPT | Performed by: INTERNAL MEDICINE

## 2025-09-02 PROCEDURE — 85027 COMPLETE CBC AUTOMATED: CPT

## 2025-09-02 PROCEDURE — 36415 COLL VENOUS BLD VENIPUNCTURE: CPT

## 2025-09-02 PROCEDURE — 99204 OFFICE O/P NEW MOD 45 MIN: CPT | Performed by: INTERNAL MEDICINE

## 2025-09-03 ENCOUNTER — OFFICE VISIT (OUTPATIENT)
Age: 30
End: 2025-09-03
Payer: COMMERCIAL

## 2025-09-03 VITALS
HEIGHT: 65 IN | WEIGHT: 250 LBS | TEMPERATURE: 96.8 F | OXYGEN SATURATION: 98 % | BODY MASS INDEX: 41.65 KG/M2 | HEART RATE: 91 BPM

## 2025-09-03 DIAGNOSIS — H93.8X1 SENSATION OF FULLNESS IN RIGHT EAR: ICD-10-CM

## 2025-09-03 DIAGNOSIS — R06.09 DYSPNEA ON EXERTION: ICD-10-CM

## 2025-09-03 DIAGNOSIS — R09.81 CHRONIC NASAL CONGESTION: ICD-10-CM

## 2025-09-03 DIAGNOSIS — J38.6 ACQUIRED SUBGLOTTIC STENOSIS: ICD-10-CM

## 2025-09-03 DIAGNOSIS — K21.9 GASTROESOPHAGEAL REFLUX DISEASE WITHOUT ESOPHAGITIS: ICD-10-CM

## 2025-09-03 DIAGNOSIS — R09.A2 GLOBUS SENSATION: Primary | ICD-10-CM

## 2025-09-03 DIAGNOSIS — J30.89 NON-SEASONAL ALLERGIC RHINITIS, UNSPECIFIED TRIGGER: ICD-10-CM

## 2025-09-03 PROCEDURE — 99204 OFFICE O/P NEW MOD 45 MIN: CPT | Performed by: STUDENT IN AN ORGANIZED HEALTH CARE EDUCATION/TRAINING PROGRAM

## 2025-09-03 PROCEDURE — 31575 DIAGNOSTIC LARYNGOSCOPY: CPT | Performed by: STUDENT IN AN ORGANIZED HEALTH CARE EDUCATION/TRAINING PROGRAM
